# Patient Record
Sex: MALE | Race: WHITE | NOT HISPANIC OR LATINO | URBAN - METROPOLITAN AREA
[De-identification: names, ages, dates, MRNs, and addresses within clinical notes are randomized per-mention and may not be internally consistent; named-entity substitution may affect disease eponyms.]

---

## 2020-10-26 ENCOUNTER — OUT OF OFFICE VISIT (OUTPATIENT)
Dept: URBAN - METROPOLITAN AREA MEDICAL CENTER 25 | Facility: MEDICAL CENTER | Age: 30
End: 2020-10-26
Payer: COMMERCIAL

## 2020-10-26 DIAGNOSIS — K92.0 BLOODY EMESIS: ICD-10-CM

## 2020-10-26 DIAGNOSIS — R93.3 ABN FINDINGS-GI TRACT: ICD-10-CM

## 2020-10-26 DIAGNOSIS — R74.8 ABNORMAL ALKALINE PHOSPHATASE TEST: ICD-10-CM

## 2020-10-26 DIAGNOSIS — K70.10 ACUTE ALCOHOLIC HEPATITIS: ICD-10-CM

## 2020-10-26 PROCEDURE — 99254 IP/OBS CNSLTJ NEW/EST MOD 60: CPT | Performed by: INTERNAL MEDICINE

## 2020-10-30 ENCOUNTER — OUT OF OFFICE VISIT (OUTPATIENT)
Dept: URBAN - METROPOLITAN AREA MEDICAL CENTER 25 | Facility: MEDICAL CENTER | Age: 30
End: 2020-10-30
Payer: COMMERCIAL

## 2020-10-30 DIAGNOSIS — K92.0 BLOODY EMESIS: ICD-10-CM

## 2020-10-30 DIAGNOSIS — I85.10 ESOPH VARICE OTHER DIS: ICD-10-CM

## 2020-10-30 DIAGNOSIS — K70.10 ACUTE ALCOHOLIC HEPATITIS: ICD-10-CM

## 2020-10-30 PROCEDURE — 43235 EGD DIAGNOSTIC BRUSH WASH: CPT | Performed by: INTERNAL MEDICINE

## 2020-11-09 ENCOUNTER — OUT OF OFFICE VISIT (OUTPATIENT)
Dept: URBAN - METROPOLITAN AREA MEDICAL CENTER 25 | Facility: MEDICAL CENTER | Age: 30
End: 2020-11-09
Payer: COMMERCIAL

## 2020-11-09 DIAGNOSIS — G93.41 ENCEPHALOPATHY, METABOLIC: ICD-10-CM

## 2020-11-09 DIAGNOSIS — R19.7 ACUTE DIARRHEA: ICD-10-CM

## 2020-11-09 DIAGNOSIS — A04.72 C. DIFFICILE COLITIS: ICD-10-CM

## 2020-11-09 DIAGNOSIS — R74.8 ABNORMAL ALKALINE PHOSPHATASE TEST: ICD-10-CM

## 2020-11-09 PROCEDURE — 99233 SBSQ HOSP IP/OBS HIGH 50: CPT | Performed by: INTERNAL MEDICINE

## 2020-11-09 PROCEDURE — 99254 IP/OBS CNSLTJ NEW/EST MOD 60: CPT | Performed by: INTERNAL MEDICINE

## 2020-11-09 PROCEDURE — 99232 SBSQ HOSP IP/OBS MODERATE 35: CPT | Performed by: INTERNAL MEDICINE

## 2021-04-18 ENCOUNTER — OUT OF OFFICE VISIT (OUTPATIENT)
Dept: URBAN - METROPOLITAN AREA MEDICAL CENTER 25 | Facility: MEDICAL CENTER | Age: 31
End: 2021-04-18
Payer: COMMERCIAL

## 2021-04-18 DIAGNOSIS — K92.0 BLOODY EMESIS: ICD-10-CM

## 2021-04-18 DIAGNOSIS — F10.129 ALCOHOL ABUSE WITH INTOXICATION, UNSPECIFIED: ICD-10-CM

## 2021-04-18 DIAGNOSIS — K70.10 ACUTE ALCOHOLIC HEPATITIS: ICD-10-CM

## 2021-04-18 DIAGNOSIS — K92.1 BLACK STOOL: ICD-10-CM

## 2021-04-18 PROCEDURE — 99254 IP/OBS CNSLTJ NEW/EST MOD 60: CPT | Performed by: INTERNAL MEDICINE

## 2021-04-19 ENCOUNTER — OUT OF OFFICE VISIT (OUTPATIENT)
Dept: URBAN - METROPOLITAN AREA MEDICAL CENTER 25 | Facility: MEDICAL CENTER | Age: 31
End: 2021-04-19
Payer: COMMERCIAL

## 2021-04-19 DIAGNOSIS — F10.239 ALCOHOL DEPENDENCE WITH WITHDRAWAL, UNSPECIFIED: ICD-10-CM

## 2021-04-19 DIAGNOSIS — R74.01 ALT (SGPT) LEVEL RAISED: ICD-10-CM

## 2021-04-19 DIAGNOSIS — D69.6 ACQUIRED THROMBOCYTOPENIA: ICD-10-CM

## 2021-04-19 DIAGNOSIS — R11.2 ACUTE NAUSEA WITH NONBILIOUS VOMITING: ICD-10-CM

## 2021-04-19 PROCEDURE — 99232 SBSQ HOSP IP/OBS MODERATE 35: CPT | Performed by: INTERNAL MEDICINE

## 2021-04-20 ENCOUNTER — OUT OF OFFICE VISIT (OUTPATIENT)
Dept: URBAN - METROPOLITAN AREA MEDICAL CENTER 25 | Facility: MEDICAL CENTER | Age: 31
End: 2021-04-20
Payer: COMMERCIAL

## 2021-04-20 DIAGNOSIS — R74.8 ABNORMAL ALKALINE PHOSPHATASE TEST: ICD-10-CM

## 2021-04-20 DIAGNOSIS — R11.0 CHRONIC NAUSEA: ICD-10-CM

## 2021-04-20 DIAGNOSIS — R93.3 ABN FINDINGS-GI TRACT: ICD-10-CM

## 2021-04-20 PROCEDURE — 99233 SBSQ HOSP IP/OBS HIGH 50: CPT | Performed by: INTERNAL MEDICINE

## 2021-04-20 PROCEDURE — 99232 SBSQ HOSP IP/OBS MODERATE 35: CPT | Performed by: INTERNAL MEDICINE

## 2021-04-21 ENCOUNTER — LAB OUTSIDE AN ENCOUNTER (OUTPATIENT)
Dept: URBAN - METROPOLITAN AREA CLINIC 19 | Facility: CLINIC | Age: 31
End: 2021-04-21

## 2021-05-04 ENCOUNTER — OFFICE VISIT (OUTPATIENT)
Dept: URBAN - METROPOLITAN AREA CLINIC 19 | Facility: CLINIC | Age: 31
End: 2021-05-04

## 2021-05-04 ENCOUNTER — OFFICE VISIT (OUTPATIENT)
Dept: URBAN - METROPOLITAN AREA CLINIC 19 | Facility: CLINIC | Age: 31
End: 2021-05-04
Payer: COMMERCIAL

## 2021-05-04 DIAGNOSIS — I85.00 ESOPHAGEAL VARICES: ICD-10-CM

## 2021-05-04 DIAGNOSIS — K70.11 ALCOHOLIC HEPATITIS WITH ASCITES: ICD-10-CM

## 2021-05-04 DIAGNOSIS — K72.90 HEPATIC ENCEPHALOPATHY: ICD-10-CM

## 2021-05-04 PROCEDURE — 99214 OFFICE O/P EST MOD 30 MIN: CPT | Performed by: INTERNAL MEDICINE

## 2021-05-04 RX ORDER — LACTULOSE 10 G/15ML
15 ML SOLUTION ORAL TID
Qty: 4050 ML | Refills: 3
End: 2022-04-29

## 2021-05-04 RX ORDER — FOLIC ACID 1 MG/1
1 TABLET TABLET ORAL ONCE A DAY
Qty: 90 | Refills: 3
End: 2022-04-29

## 2021-05-04 RX ORDER — ZINC SULFATE TAB 220 MG (50 MG ZINC EQUIVALENT) 220 (50 ZN) MG
1 TABLET TAB ORAL ONCE A DAY
Qty: 90 TABLET | Refills: 3
End: 2022-04-29

## 2021-05-04 RX ORDER — NADOLOL 40 MG/1
1 TABLET TABLET ORAL ONCE A DAY
Qty: 90 TABLET | Refills: 3 | OUTPATIENT
Start: 2021-05-04

## 2021-05-04 NOTE — HPI-TODAY'S VISIT:
Patient presents as a referral from his PCP, Dr. Mana Castaneda, for evaluation of alcoholic liver disease, for which he was admitted (along with other alcohol-related issues) multiple times over the past year at [facility] (3/21/21 to 3/25/21, 2/28/21 to 3/3/21, 11/8/20 to 11/11/20, 10/25/20 to 11/1/20).  Records reviewed. A copy of this note will be sent to the referring provider.  He has been drinking since age 13, and he also has been addicted to methamphetamines.  Has been in rehab in the past, and he has attended AA with multiple relapses.  During his last admission (4/17/21 to 4/23/21), he was brought in due to severe alcoholic intoxication causing him to pass out, and when he was more coherent, he requested admission for detox.  He was given a list of resources for rehab afterwards, but he has no insurance and cannot afford it right now.  He had told the ED physician that he had been vomiting blood for about a week and seeing dark stools - prior EGD in October showed diminutive esophageal varices and alcoholic gastritis.  CT on 3/21/21 had shown hepatic steatosis but not cirrhosis.  Ultrasound on 4/21/21 showed the same findings.  He has had elevated ammonia levels on a few of these admissions, so he has been diagnosed with hepatic encephalopathy, for which he takes lactulose.  He seems to understand the seriousness of his condition and realizes that without total abstinence from alcohol, he will develop cirrhosis with the potential need for a liver transplant in the future if he wants to survive.  Last labs on 4/23/21: Na 139, K 3.8, Cl 107, CO2 24, glu 98, BUN 2, Cr 0.7, Ca 8.8, Prot 6.5, Alb 3.4, , , , Bili 1.2, WBC 5.8, Hgb 14.3, Hct 42.9, Plt 146, PT/INR 14.5/1.2 (4/22/21).

## 2021-05-10 ENCOUNTER — TELEPHONE ENCOUNTER (OUTPATIENT)
Dept: URBAN - METROPOLITAN AREA CLINIC 19 | Facility: CLINIC | Age: 31
End: 2021-05-10

## 2021-07-15 ENCOUNTER — TELEPHONE ENCOUNTER (OUTPATIENT)
Dept: URBAN - METROPOLITAN AREA CLINIC 19 | Facility: CLINIC | Age: 31
End: 2021-07-15

## 2021-07-20 ENCOUNTER — WEB ENCOUNTER (OUTPATIENT)
Dept: URBAN - METROPOLITAN AREA CLINIC 19 | Facility: CLINIC | Age: 31
End: 2021-07-20

## 2021-07-20 ENCOUNTER — OFFICE VISIT (OUTPATIENT)
Dept: URBAN - METROPOLITAN AREA CLINIC 19 | Facility: CLINIC | Age: 31
End: 2021-07-20
Payer: COMMERCIAL

## 2021-07-20 DIAGNOSIS — K70.11 ALCOHOLIC HEPATITIS WITH ASCITES: ICD-10-CM

## 2021-07-20 DIAGNOSIS — K72.90 HEPATIC ENCEPHALOPATHY: ICD-10-CM

## 2021-07-20 DIAGNOSIS — I85.00 ESOPHAGEAL VARICES: ICD-10-CM

## 2021-07-20 DIAGNOSIS — F10.239 ALCOHOL WITHDRAWAL: ICD-10-CM

## 2021-07-20 PROCEDURE — 99215 OFFICE O/P EST HI 40 MIN: CPT | Performed by: INTERNAL MEDICINE

## 2021-07-20 RX ORDER — LACTULOSE 10 G/15ML
15 ML SOLUTION ORAL TID
Qty: 4050 ML | Refills: 3 | Status: ACTIVE | COMMUNITY
End: 2022-04-29

## 2021-07-20 RX ORDER — FOLIC ACID 1 MG/1
1 TABLET TABLET ORAL ONCE A DAY
Qty: 90 | Refills: 3 | Status: ACTIVE | COMMUNITY
End: 2022-04-29

## 2021-07-20 RX ORDER — NADOLOL 40 MG/1
1 TABLET TABLET ORAL ONCE A DAY
Qty: 90 TABLET | Refills: 3 | Status: ACTIVE | COMMUNITY
Start: 2021-05-04

## 2021-07-20 RX ORDER — ZINC SULFATE TAB 220 MG (50 MG ZINC EQUIVALENT) 220 (50 ZN) MG
1 TABLET TAB ORAL ONCE A DAY
Qty: 90 TABLET | Refills: 3 | Status: ACTIVE | COMMUNITY
End: 2022-04-29

## 2021-07-20 NOTE — HPI-TODAY'S VISIT:
Patient presents as a referral from his PCP, Dr. Mana Castaneda, for evaluation of alcoholic liver disease, for which he was admitted (along with other alcohol-related issues) multiple times over the past year at [facility] (3/21/21 to 3/25/21, 2/28/21 to 3/3/21, 11/8/20 to 11/11/20, 10/25/20 to 11/1/20).  Records reviewed. A copy of this note will be sent to the referring provider.  He has been drinking since age 13, and he also has been addicted to methamphetamines.  Has been in rehab in the past, and he has attended AA with multiple relapses.  During his last admission (4/17/21 to 4/23/21), he was brought in due to severe alcoholic intoxication causing him to pass out, and when he was more coherent, he requested admission for detox.  He was given a list of resources for rehab afterwards, but he has no insurance and cannot afford it right now.  He had told the ED physician that he had been vomiting blood for about a week and seeing dark stools - prior EGD in October showed diminutive esophageal varices and alcoholic gastritis.  CT on 3/21/21 had shown hepatic steatosis but not cirrhosis.  Ultrasound on 4/21/21 showed the same findings.  He has had elevated ammonia levels on a few of these admissions, so he has been diagnosed with hepatic encephalopathy, for which he takes lactulose.  He seems to understand the seriousness of his condition and realizes that without total abstinence from alcohol, he will develop cirrhosis with the potential need for a liver transplant in the future if he wants to survive.  Last labs on 4/23/21: Na 139, K 3.8, Cl 107, CO2 24, glu 98, BUN 2, Cr 0.7, Ca 8.8, Prot 6.5, Alb 3.4, , , , Bili 1.2, WBC 5.8, Hgb 14.3, Hct 42.9, Plt 146, PT/INR 14.5/1.2 (4/22/21).  7/20/21:  Patient was brought to my office today by his mother and grandmother with the pretense that he was having acute abdominal pain.  The patient does not appear to be having severe abdominal pain; however, he has noticed some pain in his foot that he thinks may be gout, and he also has some "red spots" over his abdomen.  He has apparently lost 20 pounds, but he is still overweight.  However, it appears that the patient is still having issues with alcohol addiction.  He was apparently in a detox center earlier this month and started back drinking almost immediately.  He looks visibly tremulous and pale and admittedly drank last night as well.  When I mentioned how sick he looked and the need to go to the ER to rule out things like delirium tremens, pancreatitis, severe alcoholic hepatitis, etc., he refused.  While he verbally refused to be taken to the ER by his family, he was unable to even get out of his chair in the examination room.  EMS was called to bring him to the ER, and I notified the [facility] ER that he was on his way.  I spoke at length with him, his mother, and his grandmother that his mortality from an alcohol-related illness is extremely high.  It is not clear why he is so resistant to alcohol abuse counseling.

## 2021-07-26 PROBLEM — 191480000 ALCOHOL WITHDRAWAL SYNDROME: Status: ACTIVE | Noted: 2021-07-26

## 2021-09-28 ENCOUNTER — CLAIMS CREATED FROM THE CLAIM WINDOW (OUTPATIENT)
Dept: URBAN - METROPOLITAN AREA TELEHEALTH 2 | Facility: TELEHEALTH | Age: 31
End: 2021-09-28
Payer: COMMERCIAL

## 2021-09-28 ENCOUNTER — LAB OUTSIDE AN ENCOUNTER (OUTPATIENT)
Dept: URBAN - METROPOLITAN AREA CLINIC 19 | Facility: CLINIC | Age: 31
End: 2021-09-28

## 2021-09-28 ENCOUNTER — OFFICE VISIT (OUTPATIENT)
Dept: URBAN - METROPOLITAN AREA CLINIC 19 | Facility: CLINIC | Age: 31
End: 2021-09-28

## 2021-09-28 DIAGNOSIS — K70.11 ALCOHOLIC HEPATITIS WITH ASCITES: ICD-10-CM

## 2021-09-28 PROBLEM — 389026000: Status: ACTIVE | Noted: 2021-09-28

## 2021-09-28 LAB
ABSOLUTE NRBC COUNT: 0
AG RATIO: 1
ALBUMIN LEVEL: 4
ALK PHOS: 163
ALT: 117
ANION GAP: 15
AST: 265
BILIRUBIN TOTAL: 1.2
BUN/CREAT RATIO: 4
BUN: 3
CALCIUM LEVEL: 9.4
CHLORIDE LEVEL: 106
CO2 LEVEL: 21
CREATININE LEVEL: 0.7
GFR AFRICAN AMERICAN: >60
GFR NON AFRICAN AMERICAN: >60
GLUCOSE LEVEL: 155
HCT: 35.8
HGB: 11.4
INR: 1.2
MCH: 32.6
MCHC: 31.8
MCV: 102.3
MPV: 10.9
NRBC AUTO: 0
OSMO (CALC): 283
PERFORMING LAB: (no result)
PLATELETS: 377
POTASSIUM LEVEL: 3
PROTEIN TOTAL: 7.3
PT: 14.9
RBC: 3.5
RDW: 15.4
SODIUM LEVEL: 142
WBC: 10.5

## 2021-09-28 PROCEDURE — 99214 OFFICE O/P EST MOD 30 MIN: CPT | Performed by: NURSE PRACTITIONER

## 2021-09-28 PROCEDURE — 99214 OFFICE O/P EST MOD 30 MIN: CPT | Performed by: INTERNAL MEDICINE

## 2021-09-28 RX ORDER — LACTULOSE 10 G/15ML
15 ML SOLUTION ORAL TID
Qty: 4050 ML | Refills: 3 | Status: ON HOLD | COMMUNITY
End: 2022-04-29

## 2021-09-28 RX ORDER — NADOLOL 40 MG/1
1 TABLET TABLET ORAL ONCE A DAY
Qty: 90 TABLET | Refills: 3 | Status: ON HOLD | COMMUNITY
Start: 2021-05-04

## 2021-09-28 RX ORDER — ZINC SULFATE TAB 220 MG (50 MG ZINC EQUIVALENT) 220 (50 ZN) MG
1 TABLET TAB ORAL ONCE A DAY
Qty: 90 TABLET | Refills: 3 | Status: ACTIVE | COMMUNITY
End: 2022-04-29

## 2021-09-28 RX ORDER — FOLIC ACID 1 MG/1
1 TABLET TABLET ORAL ONCE A DAY
Qty: 90 | Refills: 3 | Status: ACTIVE | COMMUNITY
End: 2022-04-29

## 2021-09-28 NOTE — HPI-TODAY'S VISIT:
Patient presents as a referral from his PCP, Dr. Mana Castaneda, for evaluation of alcoholic liver disease, for which he was admitted (along with other alcohol-related issues) multiple times over the past year at [facility] (3/21/21 to 3/25/21, 2/28/21 to 3/3/21, 11/8/20 to 11/11/20, 10/25/20 to 11/1/20).  Records reviewed. A copy of this note will be sent to the referring provider.  He has been drinking since age 13, and he also has been addicted to methamphetamines.  Has been in rehab in the past, and he has attended AA with multiple relapses.  During his last admission (4/17/21 to 4/23/21), he was brought in due to severe alcoholic intoxication causing him to pass out, and when he was more coherent, he requested admission for detox.  He was given a list of resources for rehab afterwards, but he has no insurance and cannot afford it right now.  He had told the ED physician that he had been vomiting blood for about a week and seeing dark stools - prior EGD in October showed diminutive esophageal varices and alcoholic gastritis.  CT on 3/21/21 had shown hepatic steatosis but not cirrhosis.  Ultrasound on 4/21/21 showed the same findings.  He has had elevated ammonia levels on a few of these admissions, so he has been diagnosed with hepatic encephalopathy, for which he takes lactulose.  He seems to understand the seriousness of his condition and realizes that without total abstinence from alcohol, he will develop cirrhosis with the potential need for a liver transplant in the future if he wants to survive.  Last labs on 4/23/21: Na 139, K 3.8, Cl 107, CO2 24, glu 98, BUN 2, Cr 0.7, Ca 8.8, Prot 6.5, Alb 3.4, , , , Bili 1.2, WBC 5.8, Hgb 14.3, Hct 42.9, Plt 146, PT/INR 14.5/1.2 (4/22/21).  7/20/21:  Patient was brought to my office today by his mother and grandmother with the pretense that he was having acute abdominal pain.  The patient does not appear to be having severe abdominal pain; however, he has noticed some pain in his foot that he thinks may be gout, and he also has some "red spots" over his abdomen.  He has apparently lost 20 pounds, but he is still overweight.  However, it appears that the patient is still having issues with alcohol addiction.  He was apparently in a detox center earlier this month and started back drinking almost immediately.  He looks visibly tremulous and pale and admittedly drank last night as well.  When I mentioned how sick he looked and the need to go to the ER to rule out things like delirium tremens, pancreatitis, severe alcoholic hepatitis, etc., he refused.  While he verbally refused to be taken to the ER by his family, he was unable to even get out of his chair in the examination room.  EMS was called to bring him to the ER, and I notified the [facility] ER that he was on his way.  I spoke at length with him, his mother, and his grandmother that his mortality from an alcohol-related illness is extremely high.  It is not clear why he is so resistant to alcohol abuse counseling.  9/28/21: Today he reports the left side of his abdomen is swollen.  He reports it has been gone down a lot since he made the appointment.  He reports typically it is the right side that swells when he drinks.  Right side is not swollen at all currently.  This started about 3 and half weeks ago.  He reports he has never had a paracentesis.  He denies any abdominal pain.  He reports he stopped drinking about a week and a half ago and then started drinking again about 2 nights ago.  He reports he typically binge drinks with less time in between binging episodes with more frequent liver disease symptoms.  He reports he typically drinks a handle of fireball in a day when he is binging and these binges will last 3 to 7 days.  He reports he has not had any lab work recently.  He states the only medications he is currently taking is his vitamins occasionally.  He reports he takes lactulose when he has confusion.  He denies any confusion and reports he has not taken lactulose in weeks.  He reports when his abdomen began swelling he has noticed his skin and eyes turning yellow.  He reports they are normal now but that often happens when he drinks.  He also has noticed swelling in his legs from his knees to his feet.  He denies fever.  Has never taken any diuretics.  Reports his urine is dark yellow.  He endorses itching and shortness of breath.  He reports he went to rehab in 2016 but does not have insurance now which is why he has not pursued it.  He believes AA has not helped and actually made him want to drink more.

## 2021-09-29 ENCOUNTER — TELEPHONE ENCOUNTER (OUTPATIENT)
Dept: URBAN - METROPOLITAN AREA CLINIC 19 | Facility: CLINIC | Age: 31
End: 2021-09-29

## 2021-09-30 LAB
AFP TUMOR MARKER: (no result)
PERFORMING LAB: (no result)

## 2021-10-05 ENCOUNTER — LAB OUTSIDE AN ENCOUNTER (OUTPATIENT)
Dept: URBAN - METROPOLITAN AREA CLINIC 19 | Facility: CLINIC | Age: 31
End: 2021-10-05

## 2021-10-05 ENCOUNTER — OUT OF OFFICE VISIT (OUTPATIENT)
Dept: URBAN - METROPOLITAN AREA MEDICAL CENTER 25 | Facility: MEDICAL CENTER | Age: 31
End: 2021-10-05
Payer: COMMERCIAL

## 2021-10-05 DIAGNOSIS — R11.2 ACUTE NAUSEA WITH NONBILIOUS VOMITING: ICD-10-CM

## 2021-10-05 DIAGNOSIS — K76.9 ACUTE LIVER DISEASE: ICD-10-CM

## 2021-10-05 DIAGNOSIS — R74.8 ABNORMAL ALKALINE PHOSPHATASE TEST: ICD-10-CM

## 2021-10-05 DIAGNOSIS — R19.5 ABNORMAL CONSISTENCY OF STOOL: ICD-10-CM

## 2021-10-05 PROCEDURE — 99232 SBSQ HOSP IP/OBS MODERATE 35: CPT | Performed by: INTERNAL MEDICINE

## 2021-10-05 PROCEDURE — 99254 IP/OBS CNSLTJ NEW/EST MOD 60: CPT | Performed by: INTERNAL MEDICINE

## 2021-10-08 ENCOUNTER — OUT OF OFFICE VISIT (OUTPATIENT)
Dept: URBAN - METROPOLITAN AREA MEDICAL CENTER 25 | Facility: MEDICAL CENTER | Age: 31
End: 2021-10-08
Payer: COMMERCIAL

## 2021-10-08 DIAGNOSIS — I85.10 ESOPH VARICE OTHER DIS: ICD-10-CM

## 2021-10-08 DIAGNOSIS — K76.6 CLINICALLY SIGNIFICANT PORTAL HYPERTENSION: ICD-10-CM

## 2021-10-08 DIAGNOSIS — K92.1 ACUTE MELENA: ICD-10-CM

## 2021-10-08 PROCEDURE — 43235 EGD DIAGNOSTIC BRUSH WASH: CPT | Performed by: INTERNAL MEDICINE

## 2021-10-08 PROCEDURE — 99232 SBSQ HOSP IP/OBS MODERATE 35: CPT | Performed by: INTERNAL MEDICINE

## 2021-10-25 ENCOUNTER — TELEPHONE ENCOUNTER (OUTPATIENT)
Dept: URBAN - METROPOLITAN AREA SURGERY CENTER 30 | Facility: SURGERY CENTER | Age: 31
End: 2021-10-25

## 2021-10-25 RX ORDER — ONDANSETRON 4 MG/1
1 TABLET ON THE TONGUE AND ALLOW TO DISSOLVE TABLET, ORALLY DISINTEGRATING ORAL
Qty: 40 | Refills: 1 | OUTPATIENT
Start: 2021-10-26

## 2021-10-29 ENCOUNTER — OFFICE VISIT (OUTPATIENT)
Dept: URBAN - METROPOLITAN AREA TELEHEALTH 2 | Facility: TELEHEALTH | Age: 31
End: 2021-10-29
Payer: COMMERCIAL

## 2021-10-29 DIAGNOSIS — K70.30 ALCOHOLIC CIRRHOSIS, UNSPECIFIED WHETHER ASCITES PRESENT: ICD-10-CM

## 2021-10-29 DIAGNOSIS — R11.14 BILIOUS VOMITING WITH NAUSEA: ICD-10-CM

## 2021-10-29 PROCEDURE — 99213 OFFICE O/P EST LOW 20 MIN: CPT | Performed by: INTERNAL MEDICINE

## 2021-10-29 RX ORDER — ZINC SULFATE TAB 220 MG (50 MG ZINC EQUIVALENT) 220 (50 ZN) MG
1 TABLET TAB ORAL ONCE A DAY
Qty: 90 TABLET | Refills: 3 | Status: ON HOLD | COMMUNITY
End: 2022-04-29

## 2021-10-29 RX ORDER — ONDANSETRON 4 MG/1
1 TABLET ON THE TONGUE AND ALLOW TO DISSOLVE TABLET, ORALLY DISINTEGRATING ORAL
Qty: 40 | Refills: 1 | Status: ACTIVE | COMMUNITY
Start: 2021-10-26

## 2021-10-29 RX ORDER — LACTULOSE 10 G/15ML
15 ML SOLUTION ORAL TID
Qty: 4050 ML | Refills: 3 | Status: ACTIVE | COMMUNITY
End: 2022-04-29

## 2021-10-29 RX ORDER — NADOLOL 40 MG/1
1 TABLET TABLET ORAL ONCE A DAY
Qty: 90 TABLET | Refills: 3 | Status: ON HOLD | COMMUNITY
Start: 2021-05-04

## 2021-10-29 RX ORDER — FOLIC ACID 1 MG/1
1 TABLET TABLET ORAL ONCE A DAY
Qty: 90 | Refills: 3 | Status: ON HOLD | COMMUNITY
End: 2022-04-29

## 2021-10-29 NOTE — HPI-TODAY'S VISIT:
5/4/21: Patient presents as a referral from his PCP, Dr. Mana Castaneda, for evaluation of alcoholic liver disease, for which he was admitted (along with other alcohol-related issues) multiple times over the past year at [facility] (3/21/21 to 3/25/21, 2/28/21 to 3/3/21, 11/8/20 to 11/11/20, 10/25/20 to 11/1/20).  Records reviewed. A copy of this note will be sent to the referring provider.  He has been drinking since age 13, and he also has been addicted to methamphetamines.  Has been in rehab in the past, and he has attended AA with multiple relapses.  During his last admission (4/17/21 to 4/23/21), he was brought in due to severe alcoholic intoxication causing him to pass out, and when he was more coherent, he requested admission for detox.  He was given a list of resources for rehab afterwards, but he has no insurance and cannot afford it right now.  He had told the ED physician that he had been vomiting blood for about a week and seeing dark stools - prior EGD in October showed diminutive esophageal varices and alcoholic gastritis.  CT on 3/21/21 had shown hepatic steatosis but not cirrhosis.  Ultrasound on 4/21/21 showed the same findings.  He has had elevated ammonia levels on a few of these admissions, so he has been diagnosed with hepatic encephalopathy, for which he takes lactulose.  He seems to understand the seriousness of his condition and realizes that without total abstinence from alcohol, he will develop cirrhosis with the potential need for a liver transplant in the future if he wants to survive.  Last labs on 4/23/21: Na 139, K 3.8, Cl 107, CO2 24, glu 98, BUN 2, Cr 0.7, Ca 8.8, Prot 6.5, Alb 3.4, , , , Bili 1.2, WBC 5.8, Hgb 14.3, Hct 42.9, Plt 146, PT/INR 14.5/1.2 (4/22/21).  7/20/21:  Patient was brought to my office today by his mother and grandmother with the pretense that he was having acute abdominal pain.  The patient does not appear to be having severe abdominal pain; however, he has noticed some pain in his foot that he thinks may be gout, and he also has some "red spots" over his abdomen.  He has apparently lost 20 pounds, but he is still overweight.  However, it appears that the patient is still having issues with alcohol addiction.  He was apparently in a detox center earlier this month and started back drinking almost immediately.  He looks visibly tremulous and pale and admittedly drank last night as well.  When I mentioned how sick he looked and the need to go to the ER to rule out things like delirium tremens, pancreatitis, severe alcoholic hepatitis, etc., he refused.  While he verbally refused to be taken to the ER by his family, he was unable to even get out of his chair in the examination room.  EMS was called to bring him to the ER, and I notified the [facility] ER that he was on his way.  I spoke at length with him, his mother, and his grandmother that his mortality from an alcohol-related illness is extremely high.  It is not clear why he is so resistant to alcohol abuse counseling.  9/28/21: Today he reports the left side of his abdomen is swollen.  He reports it has been gone down a lot since he made the appointment.  He reports typically it is the right side that swells when he drinks.  Right side is not swollen at all currently.  This started about 3 and half weeks ago.  He reports he has never had a paracentesis.  He denies any abdominal pain.  He reports he stopped drinking about a week and a half ago and then started drinking again about 2 nights ago.  He reports he typically binge drinks with less time in between binging episodes with more frequent liver disease symptoms.  He reports he typically drinks a handle of fireball in a day when he is binging and these binges will last 3 to 7 days.  He reports he has not had any lab work recently.  He states the only medications he is currently taking is his vitamins occasionally.  He reports he takes lactulose when he has confusion.  He denies any confusion and reports he has not taken lactulose in weeks.  He reports when his abdomen began swelling he has noticed his skin and eyes turning yellow.  He reports they are normal now but that often happens when he drinks.  He also has noticed swelling in his legs from his knees to his feet.  He denies fever.  Has never taken any diuretics.  Reports his urine is dark yellow.  He endorses itching and shortness of breath.  He reports he went to rehab in 2016 but does not have insurance now which is why he has not pursued it.  He believes AA has not helped and actually made him want to drink more.   10/29/21: Today, Mr. Deng is following up from recent hospital admission. He was admitted to [facility] from 10/5/2021 to 10/9/2021 for alcohol intoxication/withdrawal and seizures.  Also reported black stools as well as nausea, vomiting, and left-sided abdominal pain. He had reported he had been drinking between 1 and 3 handles daily and had a seizure the morning he presented to the ER and hit his head.  Reported he had been trying to cut back on drinking.  Noncompliant with lactulose.  Labs- WBC 12.3, Hgb 13.5, HCT 41.4, MCV 98.6, platelets 444, PT 16.4, INR 1.4, sodium 144, potassium 3.1, creatinine 0.7, bilirubin 1.3, alk phos 204, , ALT 57, ammonia 110, ethanol 338.  CT angio-no aortic aneurysm or dissection.  Again present is marked hepatomegaly with diffuse fatty infiltration.  EGD with Dr. Saleem 10/8/2021-small less than 5 mm esophageal varices.  Portal hypertensive gastropathy.  Normal examined duodenum.  No specimens collected.  He was started on lactulose, Xifaxan, and zinc as well as hydrocortisone cream for possible hemorrhoids.  Labs at discharge- WBC 7.1, Hgb 11.1, platelets 129, bili 1.4, alk phos 213, , ALT 44.  Today, he reports he is trying to cut back on his alcohol use.  He reports that prior to going to the ER his dad passed away from an overdose.  Currently drinking about half a handle to a handle of liquor a day.  Denies ascites.  Has never had a paracentesis.  Denies jaundice.  Reports some itching on the back.  Confusion a couple of times since leaving hospital.  He states the only medication he is taking his lactulose about twice a day.  Reports his mom calls him and remind him to take it.  He is also taking Zofran for nausea, which helps for about 2 hours.  Not on any other medications.  No abdominal pain at this time.  He does report he is vomiting about every day.  Emesis is yellow, no blood.  Typically happens in the morning.  Having bowel movements daily that are typically loose-has gone 4 times today.  Reports occasional dark stools.  Still uninterested in inpatient detox or any alcohol cessation help.

## 2021-10-29 NOTE — PHYSICAL EXAM CHEST:
no lesions,  no deformities,  no masses present, breathing is unlabored without accessory muscle use

## 2021-11-03 ENCOUNTER — TELEPHONE ENCOUNTER (OUTPATIENT)
Dept: URBAN - METROPOLITAN AREA CLINIC 19 | Facility: CLINIC | Age: 31
End: 2021-11-03

## 2021-11-19 ENCOUNTER — TELEPHONE ENCOUNTER (OUTPATIENT)
Dept: URBAN - METROPOLITAN AREA CLINIC 19 | Facility: CLINIC | Age: 31
End: 2021-11-19

## 2021-11-19 RX ORDER — ONDANSETRON 4 MG/1
1 TABLET ON THE TONGUE AND ALLOW TO DISSOLVE TABLET, ORALLY DISINTEGRATING ORAL
Qty: 40 | Refills: 1 | Status: ACTIVE | COMMUNITY
Start: 2021-10-26

## 2021-11-19 RX ORDER — ZINC SULFATE TAB 220 MG (50 MG ZINC EQUIVALENT) 220 (50 ZN) MG
1 TABLET TAB ORAL ONCE A DAY
Qty: 90 TABLET | Refills: 3 | Status: ON HOLD | COMMUNITY
End: 2022-04-29

## 2021-11-19 RX ORDER — PANTOPRAZOLE SODIUM 40 MG/1
1 TABLET TABLET, DELAYED RELEASE ORAL ONCE A DAY
Qty: 90 TABLET | Refills: 1 | OUTPATIENT
Start: 2021-11-23

## 2021-11-19 RX ORDER — LACTULOSE 10 G/15ML
15 ML SOLUTION ORAL TID
Qty: 4050 ML | Refills: 3 | Status: ACTIVE | COMMUNITY
End: 2022-04-29

## 2021-11-19 RX ORDER — FOLIC ACID 1 MG/1
1 TABLET TABLET ORAL ONCE A DAY
Qty: 90 | Refills: 3 | Status: ON HOLD | COMMUNITY
End: 2022-04-29

## 2021-11-19 RX ORDER — NADOLOL 40 MG/1
1 TABLET TABLET ORAL ONCE A DAY
Qty: 90 TABLET | Refills: 3 | Status: ON HOLD | COMMUNITY
Start: 2021-05-04

## 2021-11-23 PROBLEM — 235595009: Status: ACTIVE | Noted: 2021-11-23

## 2021-12-17 ENCOUNTER — OFFICE VISIT (OUTPATIENT)
Dept: URBAN - METROPOLITAN AREA CLINIC 19 | Facility: CLINIC | Age: 31
End: 2021-12-17
Payer: COMMERCIAL

## 2021-12-17 VITALS
BODY MASS INDEX: 32.59 KG/M2 | HEART RATE: 111 BPM | TEMPERATURE: 98.3 F | SYSTOLIC BLOOD PRESSURE: 118 MMHG | WEIGHT: 202.8 LBS | OXYGEN SATURATION: 96 % | DIASTOLIC BLOOD PRESSURE: 75 MMHG | HEIGHT: 66 IN

## 2021-12-17 DIAGNOSIS — K74.60 CIRRHOSIS OF LIVER WITHOUT ASCITES, UNSPECIFIED HEPATIC CIRRHOSIS TYPE: ICD-10-CM

## 2021-12-17 PROCEDURE — 99203 OFFICE O/P NEW LOW 30 MIN: CPT | Performed by: STUDENT IN AN ORGANIZED HEALTH CARE EDUCATION/TRAINING PROGRAM

## 2021-12-17 RX ORDER — ONDANSETRON 4 MG/1
1 TABLET ON THE TONGUE AND ALLOW TO DISSOLVE TABLET, ORALLY DISINTEGRATING ORAL
Qty: 40 | Refills: 1 | Status: ACTIVE | COMMUNITY
Start: 2021-10-26

## 2021-12-17 RX ORDER — FOLIC ACID 1 MG/1
1 TABLET TABLET ORAL ONCE A DAY
Qty: 90 | Refills: 3 | Status: ON HOLD | COMMUNITY
End: 2022-04-29

## 2021-12-17 RX ORDER — ZINC SULFATE TAB 220 MG (50 MG ZINC EQUIVALENT) 220 (50 ZN) MG
1 TABLET TAB ORAL ONCE A DAY
Qty: 90 TABLET | Refills: 3 | Status: ON HOLD | COMMUNITY
End: 2022-04-29

## 2021-12-17 RX ORDER — LACTULOSE 10 G/15ML
15 ML SOLUTION ORAL TID
Qty: 4050 ML | Refills: 3 | Status: ACTIVE | COMMUNITY
End: 2022-04-29

## 2021-12-17 RX ORDER — NADOLOL 40 MG/1
1 TABLET TABLET ORAL ONCE A DAY
Qty: 90 TABLET | Refills: 3
Start: 2021-05-04

## 2021-12-17 RX ORDER — NADOLOL 40 MG/1
1 TABLET TABLET ORAL ONCE A DAY
Qty: 90 TABLET | Refills: 3 | COMMUNITY
Start: 2021-05-04

## 2021-12-17 RX ORDER — PANTOPRAZOLE SODIUM 40 MG/1
1 TABLET TABLET, DELAYED RELEASE ORAL ONCE A DAY
Qty: 90 TABLET | Refills: 1 | Status: ACTIVE | COMMUNITY
Start: 2021-11-23

## 2021-12-17 RX ORDER — FLUTICASONE FUROATE, UMECLIDINIUM BROMIDE AND VILANTEROL TRIFENATATE 100; 62.5; 25 UG/1; UG/1; UG/1
1 PUFF POWDER RESPIRATORY (INHALATION) ONCE A DAY
Status: ACTIVE | COMMUNITY

## 2021-12-17 NOTE — EXAM-FUNCTIONAL ASSESSMENT
Patient wearing mask due to COVID-19  General--no acute distress, resting comfortably Eyes--anicteric, no pallor HENT--normocephalic, atraumatic head Neck--no lymphadenopathy, non tender Chest--normal breath sounds, equal rise Heart--regular rate and rhythm Abdomen--soft, non tender, non distended, bowel sounds present, no hepatomegaly, ascietes absent NEuro:  AAO x 3, no confusion and able to provide hisotyr accurately, no BUE asterixis

## 2021-12-17 NOTE — HPI-TODAY'S VISIT:
The pt is a 30 yo M who presents for hospital follow up for cirrhosis with melena/hematemsis s/p EGD in the hospital with Grade I varices found.  His says his stomach pain is getting worse but is still tolerable.  Abdominal pain that is sharp, migratory, post-prandial and lasts for a few days.  Does not appear to have had intrinsic liver work-up.  Ethanol level high.  Continues to drink still.  Abstinent for 4-5 days.  Has his gall bladder.  Presence of anxiety, brain fog.  Previous diagnosis of this condition was made on :  about a year ago. Current symptoms:  Occasional bright red blood/hematemesis, Melena,   Has been constipated for 2 days. Recent hospitalizations:  10/08 - 10/11/21  Medications for cirrhosis: Takes lactulose occasionally and has discontinued the zinc because of nausea. Low sodium diet of max 2000 mg or 2 g daily: Adequate protein intake or seen a nutritonist for counseling on cirrhosis diet; Last variceal screening: 10/8/21 by Dr. Saleem  FH of liver diseases:  None, dad, grandfather and multiple male relative FH of luminal GI malignancies:  No known family history.

## 2021-12-23 ENCOUNTER — OUT OF OFFICE VISIT (OUTPATIENT)
Dept: URBAN - METROPOLITAN AREA MEDICAL CENTER 25 | Facility: MEDICAL CENTER | Age: 31
End: 2021-12-23
Payer: COMMERCIAL

## 2021-12-23 DIAGNOSIS — R74.8 ABNORMAL ALKALINE PHOSPHATASE TEST: ICD-10-CM

## 2021-12-23 DIAGNOSIS — R19.5 ABNORMAL CONSISTENCY OF STOOL: ICD-10-CM

## 2021-12-23 DIAGNOSIS — D64.89 ANEMIA DUE TO OTHER CAUSE: ICD-10-CM

## 2021-12-23 DIAGNOSIS — R93.3 ABN FINDINGS-GI TRACT: ICD-10-CM

## 2021-12-23 PROCEDURE — 99253 IP/OBS CNSLTJ NEW/EST LOW 45: CPT | Performed by: STUDENT IN AN ORGANIZED HEALTH CARE EDUCATION/TRAINING PROGRAM

## 2021-12-24 ENCOUNTER — OUT OF OFFICE VISIT (OUTPATIENT)
Dept: URBAN - METROPOLITAN AREA MEDICAL CENTER 25 | Facility: MEDICAL CENTER | Age: 31
End: 2021-12-24
Payer: COMMERCIAL

## 2021-12-24 DIAGNOSIS — I85.10 ESOPH VARICE OTHER DIS: ICD-10-CM

## 2021-12-24 DIAGNOSIS — R74.8 ABNORMAL ALKALINE PHOSPHATASE TEST: ICD-10-CM

## 2021-12-24 DIAGNOSIS — K92.0 BLOODY EMESIS: ICD-10-CM

## 2021-12-24 DIAGNOSIS — K70.30 ALC (ALCOHOLIC LIVER CIRRHOSIS): ICD-10-CM

## 2021-12-24 DIAGNOSIS — K76.6 CLINICALLY SIGNIFICANT PORTAL HYPERTENSION: ICD-10-CM

## 2021-12-24 PROCEDURE — 43235 EGD DIAGNOSTIC BRUSH WASH: CPT | Performed by: STUDENT IN AN ORGANIZED HEALTH CARE EDUCATION/TRAINING PROGRAM

## 2021-12-24 PROCEDURE — 99232 SBSQ HOSP IP/OBS MODERATE 35: CPT | Performed by: STUDENT IN AN ORGANIZED HEALTH CARE EDUCATION/TRAINING PROGRAM

## 2021-12-27 ENCOUNTER — OUT OF OFFICE VISIT (OUTPATIENT)
Dept: URBAN - METROPOLITAN AREA MEDICAL CENTER 25 | Facility: MEDICAL CENTER | Age: 31
End: 2021-12-27
Payer: COMMERCIAL

## 2021-12-27 DIAGNOSIS — I85.10 ESOPH VARICE OTHER DIS: ICD-10-CM

## 2021-12-27 DIAGNOSIS — D62 ABLA (ACUTE BLOOD LOSS ANEMIA): ICD-10-CM

## 2021-12-27 DIAGNOSIS — K70.10 ACUTE ALCOHOLIC HEPATITIS: ICD-10-CM

## 2021-12-27 DIAGNOSIS — K92.2 ACUTE GASTROINTESTINAL BLEEDING: ICD-10-CM

## 2021-12-27 PROCEDURE — 99232 SBSQ HOSP IP/OBS MODERATE 35: CPT | Performed by: INTERNAL MEDICINE

## 2022-01-06 ENCOUNTER — TELEPHONE ENCOUNTER (OUTPATIENT)
Dept: URBAN - METROPOLITAN AREA CLINIC 19 | Facility: CLINIC | Age: 32
End: 2022-01-06

## 2022-01-06 ENCOUNTER — WEB ENCOUNTER (OUTPATIENT)
Dept: URBAN - METROPOLITAN AREA CLINIC 19 | Facility: CLINIC | Age: 32
End: 2022-01-06

## 2022-01-06 PROBLEM — 28670008 ESOPHAGEAL VARICES: Status: ACTIVE | Noted: 2021-05-04

## 2022-01-06 PROBLEM — 19943007: Status: ACTIVE | Noted: 2021-09-28

## 2022-01-20 ENCOUNTER — OFFICE VISIT (OUTPATIENT)
Dept: URBAN - METROPOLITAN AREA CLINIC 19 | Facility: CLINIC | Age: 32
End: 2022-01-20
Payer: COMMERCIAL

## 2022-01-20 ENCOUNTER — LAB OUTSIDE AN ENCOUNTER (OUTPATIENT)
Dept: URBAN - METROPOLITAN AREA CLINIC 19 | Facility: CLINIC | Age: 32
End: 2022-01-20

## 2022-01-20 VITALS
TEMPERATURE: 97.6 F | HEART RATE: 90 BPM | BODY MASS INDEX: 32.14 KG/M2 | HEIGHT: 66 IN | DIASTOLIC BLOOD PRESSURE: 90 MMHG | WEIGHT: 200 LBS | SYSTOLIC BLOOD PRESSURE: 130 MMHG

## 2022-01-20 DIAGNOSIS — A04.72 C. DIFFICILE DIARRHEA: ICD-10-CM

## 2022-01-20 DIAGNOSIS — R11.14 BILIOUS VOMITING WITH NAUSEA: ICD-10-CM

## 2022-01-20 DIAGNOSIS — K72.90 HEPATIC ENCEPHALOPATHY: ICD-10-CM

## 2022-01-20 DIAGNOSIS — R11.0 NAUSEA: ICD-10-CM

## 2022-01-20 DIAGNOSIS — F10.10 ALCOHOL ABUSE: ICD-10-CM

## 2022-01-20 PROBLEM — 422587007: Status: ACTIVE | Noted: 2022-01-20

## 2022-01-20 PROBLEM — 71419002: Status: ACTIVE | Noted: 2021-11-03

## 2022-01-20 PROBLEM — 15167005: Status: ACTIVE | Noted: 2022-01-20

## 2022-01-20 PROBLEM — 13920009 HEPATIC ENCEPHALOPATHY: Status: ACTIVE | Noted: 2021-05-04

## 2022-01-20 PROCEDURE — 99212 OFFICE O/P EST SF 10 MIN: CPT | Performed by: STUDENT IN AN ORGANIZED HEALTH CARE EDUCATION/TRAINING PROGRAM

## 2022-01-20 RX ORDER — FLUTICASONE FUROATE, UMECLIDINIUM BROMIDE AND VILANTEROL TRIFENATATE 100; 62.5; 25 UG/1; UG/1; UG/1
1 PUFF POWDER RESPIRATORY (INHALATION) ONCE A DAY
Status: ON HOLD | COMMUNITY

## 2022-01-20 RX ORDER — ONDANSETRON 4 MG/1
1 TABLET ON THE TONGUE AND ALLOW TO DISSOLVE TABLET, ORALLY DISINTEGRATING ORAL
Qty: 40 | Refills: 1 | Status: ACTIVE | COMMUNITY
Start: 2021-10-26

## 2022-01-20 RX ORDER — PANTOPRAZOLE SODIUM 40 MG/1
1 TABLET TABLET, DELAYED RELEASE ORAL ONCE A DAY
Qty: 90 TABLET | Refills: 1 | Status: ACTIVE | COMMUNITY
Start: 2021-11-23

## 2022-01-20 RX ORDER — NADOLOL 40 MG/1
1 TABLET TABLET ORAL ONCE A DAY
Qty: 90 TABLET | Refills: 3 | Status: ACTIVE | COMMUNITY
Start: 2021-05-04

## 2022-01-20 RX ORDER — FOLIC ACID 1 MG/1
1 TABLET TABLET ORAL ONCE A DAY
Qty: 90 | Refills: 3 | Status: ON HOLD | COMMUNITY
End: 2022-04-29

## 2022-01-20 RX ORDER — ZINC SULFATE TAB 220 MG (50 MG ZINC EQUIVALENT) 220 (50 ZN) MG
1 TABLET TAB ORAL ONCE A DAY
Qty: 90 TABLET | Refills: 3 | Status: ON HOLD | COMMUNITY
End: 2022-04-29

## 2022-01-20 RX ORDER — LACTULOSE 10 G/15ML
15 ML SOLUTION ORAL TID
Qty: 4050 ML | Refills: 3 | Status: ACTIVE | COMMUNITY
End: 2022-04-29

## 2022-01-20 NOTE — HPI-TODAY'S VISIT:
The pt is here for a hospital f/u.  Initially presented for melena and nausea.  Was under CIWA for withdrawal and was diagnosed with C. diff  colitis and started on PO vancomycin.  Underwent EGD with findings of grade 1 varices without any other source of bleeding.  This had resolved on presentation and his H&H stayed stable for the remainder of the hospital visit.    Continues to have 2-5 bms in a 24 hour period.  He finished the c. diff tx for 10 days.  Has had alcohol once since.  Has been taking lactulose.Reports ongoing confusion even when he is not drinking.    Also report nausea and emesis, which is chronic.  He says he has always woken up and thrown up first thing in the morning.  Patient also reports that in the past he has been to alcohol Anonymous.

## 2022-01-20 NOTE — EXAM-FUNCTIONAL ASSESSMENT
Patient wearing mask due to COVID-19  General--no acute distress, resting comfortably Eyes--anicteric, no pallor HENT--normocephalic, atraumatic head Neck--no lymphadenopathy, non tender Chest--normal breath sounds, equal rise Heart--regular rate and rhythm Neuro--AAO x4, no bilateral upper extremity asterixis Abdomen--soft, non tender, non distended, bowel sounds present, no hepatomegaly

## 2022-01-21 ENCOUNTER — OFFICE VISIT (OUTPATIENT)
Dept: URBAN - METROPOLITAN AREA CLINIC 19 | Facility: CLINIC | Age: 32
End: 2022-01-21

## 2022-01-21 LAB
ABSOLUTE BASOPHIL COUNT: 0.07
ABSOLUTE EOSINOPHIL COUNT: 0.12
ABSOLUTE IMMATURE GRANULOCYTE  COUNT: 0.02
ABSOLUTE LYMPHOCYTE COUNT: 1.73
ABSOLUTE MONOCYTE COUNT: 0.45
ABSOLUTE NEUTROPHIL COUNT (ANC): 4.52
ABSOLUTE NRBC  COUNT: 0
AG RATIO: 1
ALBUMIN LEVEL: 4.1
ALK PHOS: 176
ALT: 38
ANION GAP: 11
AST: 58
BASOPHIL AUTO: 1
BILIRUBIN TOTAL: 0.9
BUN/CREAT RATIO: 8
BUN: 6
CALCIUM LEVEL: 9.2
CHLORIDE LEVEL: 103
CO2 LEVEL: 27
CREATININE LEVEL: 0.8
EOS AUTO: 2
GFR AFRICAN AMERICAN: >60
GFR NON AFRICAN AMERICAN: >60
GLUCOSE LEVEL: 91
HCT: 39.5
HGB: 12.8
IMMATURE GRANULOCYTES AUTO: 0.3
LYMPH AUTO: 25
MCH: 31.8
MCHC: 32.4
MCV: 98.3
MONO AUTO: 6
MPV: 12
NEUTRO AUTO: 66
NRBC AUTO: 0
OSMO (CALC): 279
PERFORMING LAB: (no result)
PLATELETS: 234
POTASSIUM LEVEL: 4
PROTEIN TOTAL: 7.4
RBC: 4.02
RDW: 13.2
SODIUM LEVEL: 141
WBC: 6.9

## 2022-02-11 ENCOUNTER — OUT OF OFFICE VISIT (OUTPATIENT)
Dept: URBAN - METROPOLITAN AREA MEDICAL CENTER 25 | Facility: MEDICAL CENTER | Age: 32
End: 2022-02-11
Payer: COMMERCIAL

## 2022-02-11 DIAGNOSIS — R93.3 ABN FINDINGS-GI TRACT: ICD-10-CM

## 2022-02-11 DIAGNOSIS — A04.72 C. DIFFICILE: ICD-10-CM

## 2022-02-11 DIAGNOSIS — K92.0 BLOODY EMESIS: ICD-10-CM

## 2022-02-11 DIAGNOSIS — R74.8 ABNORMAL ALKALINE PHOSPHATASE TEST: ICD-10-CM

## 2022-02-11 PROCEDURE — 99254 IP/OBS CNSLTJ NEW/EST MOD 60: CPT | Performed by: INTERNAL MEDICINE

## 2022-02-12 ENCOUNTER — OUT OF OFFICE VISIT (OUTPATIENT)
Dept: URBAN - METROPOLITAN AREA MEDICAL CENTER 25 | Facility: MEDICAL CENTER | Age: 32
End: 2022-02-12
Payer: COMMERCIAL

## 2022-02-12 DIAGNOSIS — K76.6 CLINICALLY SIGNIFICANT PORTAL HYPERTENSION: ICD-10-CM

## 2022-02-12 DIAGNOSIS — I85.10 ESOPH VARICE OTHER DIS: ICD-10-CM

## 2022-02-12 DIAGNOSIS — D62 ABLA (ACUTE BLOOD LOSS ANEMIA): ICD-10-CM

## 2022-02-12 PROCEDURE — 43235 EGD DIAGNOSTIC BRUSH WASH: CPT | Performed by: INTERNAL MEDICINE

## 2022-02-23 ENCOUNTER — OUT OF OFFICE VISIT (OUTPATIENT)
Dept: URBAN - METROPOLITAN AREA MEDICAL CENTER 25 | Facility: MEDICAL CENTER | Age: 32
End: 2022-02-23
Payer: COMMERCIAL

## 2022-02-23 DIAGNOSIS — K70.30 ALC (ALCOHOLIC LIVER CIRRHOSIS): ICD-10-CM

## 2022-02-23 DIAGNOSIS — R74.8 ABNORMAL ALKALINE PHOSPHATASE TEST: ICD-10-CM

## 2022-02-23 DIAGNOSIS — K92.0 BLOODY EMESIS: ICD-10-CM

## 2022-02-23 PROCEDURE — 99254 IP/OBS CNSLTJ NEW/EST MOD 60: CPT | Performed by: PHYSICIAN ASSISTANT

## 2022-02-24 ENCOUNTER — OUT OF OFFICE VISIT (OUTPATIENT)
Dept: URBAN - METROPOLITAN AREA MEDICAL CENTER 25 | Facility: MEDICAL CENTER | Age: 32
End: 2022-02-24
Payer: COMMERCIAL

## 2022-02-24 DIAGNOSIS — K92.0 BLOODY EMESIS: ICD-10-CM

## 2022-02-24 PROCEDURE — 43235 EGD DIAGNOSTIC BRUSH WASH: CPT | Performed by: INTERNAL MEDICINE

## 2022-02-25 ENCOUNTER — WEB ENCOUNTER (OUTPATIENT)
Dept: URBAN - METROPOLITAN AREA CLINIC 19 | Facility: CLINIC | Age: 32
End: 2022-02-25

## 2022-04-21 ENCOUNTER — OFFICE VISIT (OUTPATIENT)
Dept: URBAN - METROPOLITAN AREA CLINIC 19 | Facility: CLINIC | Age: 32
End: 2022-04-21

## 2022-04-23 ENCOUNTER — WEB ENCOUNTER (OUTPATIENT)
Dept: URBAN - METROPOLITAN AREA CLINIC 19 | Facility: CLINIC | Age: 32
End: 2022-04-23

## 2022-04-23 RX ORDER — PANTOPRAZOLE SODIUM 40 MG/1
1 TABLET TABLET, DELAYED RELEASE ORAL ONCE A DAY
Qty: 90 TABLET | Refills: 0
Start: 2021-11-23

## 2022-05-12 ENCOUNTER — LAB OUTSIDE AN ENCOUNTER (OUTPATIENT)
Dept: URBAN - METROPOLITAN AREA CLINIC 19 | Facility: CLINIC | Age: 32
End: 2022-05-12

## 2022-05-12 ENCOUNTER — OFFICE VISIT (OUTPATIENT)
Dept: URBAN - METROPOLITAN AREA CLINIC 19 | Facility: CLINIC | Age: 32
End: 2022-05-12
Payer: COMMERCIAL

## 2022-05-12 VITALS
HEART RATE: 52 BPM | TEMPERATURE: 98.7 F | DIASTOLIC BLOOD PRESSURE: 76 MMHG | OXYGEN SATURATION: 98 % | HEIGHT: 66 IN | BODY MASS INDEX: 29.57 KG/M2 | SYSTOLIC BLOOD PRESSURE: 128 MMHG | WEIGHT: 184 LBS

## 2022-05-12 DIAGNOSIS — R19.7 DIARRHEA OF PRESUMED INFECTIOUS ORIGIN: ICD-10-CM

## 2022-05-12 DIAGNOSIS — K70.30 ALCOHOLIC CIRRHOSIS OF LIVER WITHOUT ASCITES: ICD-10-CM

## 2022-05-12 DIAGNOSIS — A04.72 C. DIFFICILE COLITIS: ICD-10-CM

## 2022-05-12 PROBLEM — 423590009: Status: ACTIVE | Noted: 2022-05-12

## 2022-05-12 PROBLEM — 43240000: Status: ACTIVE | Noted: 2022-05-12

## 2022-05-12 PROCEDURE — 99213 OFFICE O/P EST LOW 20 MIN: CPT | Performed by: STUDENT IN AN ORGANIZED HEALTH CARE EDUCATION/TRAINING PROGRAM

## 2022-05-12 RX ORDER — ONDANSETRON 4 MG/1
1 TABLET ON THE TONGUE AND ALLOW TO DISSOLVE TABLET, ORALLY DISINTEGRATING ORAL
Qty: 40 | Refills: 1 | Status: ACTIVE | COMMUNITY
Start: 2021-10-26

## 2022-05-12 RX ORDER — PANTOPRAZOLE SODIUM 40 MG/1
1 TABLET TABLET, DELAYED RELEASE ORAL ONCE A DAY
Qty: 90 TABLET | Refills: 0 | Status: ACTIVE | COMMUNITY
Start: 2021-11-23

## 2022-05-12 RX ORDER — PROMETHAZINE HYDROCHLORIDE 12.5 MG/1
1 TABLET AS NEEDED TABLET ORAL
Qty: 60 TABLET | Refills: 1 | OUTPATIENT
Start: 2022-05-12 | End: 2022-07-11

## 2022-05-12 RX ORDER — NADOLOL 40 MG/1
1 TABLET TABLET ORAL ONCE A DAY
Qty: 90 TABLET | Refills: 3 | Status: ACTIVE | COMMUNITY
Start: 2021-05-04

## 2022-05-12 RX ORDER — FLUTICASONE FUROATE, UMECLIDINIUM BROMIDE AND VILANTEROL TRIFENATATE 100; 62.5; 25 UG/1; UG/1; UG/1
1 PUFF POWDER RESPIRATORY (INHALATION) ONCE A DAY
Status: ON HOLD | COMMUNITY

## 2022-05-12 NOTE — HPI-TODAY'S VISIT:
The patient is a 33 yo who is here for his 3 month f/u.  H/o alcoholic cirrhosis and hepatitis and c. diff infection during Dec/Zackary after which he was last followed up in clinic.  The diarrhea had resolved and it has started back again.  4 bms a day at least, with nausea and vomiting.  Has seen Dr. Orellana and she has tested him for c. diff.  At baseline 2 bms with regular formed bms.  no blood in stools.  Dr. MARLOW would like for us to weigh in on FMT given concerns for recurrent C. diff in this pt.  Sober over a month.  No drinks at all.  Before that 2-3 weeks of sobreity.  Then fell off the wagon.

## 2022-05-13 ENCOUNTER — LAB OUTSIDE AN ENCOUNTER (OUTPATIENT)
Dept: URBAN - METROPOLITAN AREA CLINIC 19 | Facility: CLINIC | Age: 32
End: 2022-05-13

## 2022-05-15 LAB
ABSOLUTE BASOPHIL COUNT: 0.06
ABSOLUTE EOSINOPHIL COUNT: 0.06
ABSOLUTE IMMATURE GRANULOCYTE  COUNT: 0.02
ABSOLUTE LYMPHOCYTE COUNT: 1.66
ABSOLUTE MONOCYTE COUNT: 0.43
ABSOLUTE NEUTROPHIL COUNT (ANC): 3.22
ABSOLUTE NRBC  COUNT: 0
AG RATIO: 2
ALBUMIN LEVEL: 4.1
ALK PHOS: 113
ALT: 36
ANION GAP: 11
AST: 49
BASOPHIL AUTO: 1
BILIRUBIN TOTAL: 1.2
BUN/CREAT RATIO: 9
BUN: 6
CALCIUM LEVEL: 9.4
CHLORIDE LEVEL: 105
CO2 LEVEL: 23
CREATININE LEVEL: 0.7
EOS AUTO: 1
GFR AFRICAN AMERICAN: >60
GFR NON AFRICAN AMERICAN: >60
GLUCOSE LEVEL: 88
HCT: 40.9
HGB: 12.9
IMMATURE GRANULOCYTES AUTO: 0.4
INR: 1.2
IPF: 18.3
LYMPH AUTO: 30
MCH: 29.8
MCHC: 31.5
MCV: 94.5
MONO AUTO: 8
MPV: 13.5
NEUTRO AUTO: 59
NRBC AUTO: 0
OSMO (CALC): 275
PERFORMING LAB: (no result)
PLATELETS: 133
POTASSIUM LEVEL: 3.5
PROTEIN TOTAL: 6.8
PT: 15
RBC: 4.33
RDW: 14.5
SLIDE REVIEW: (no result)
SODIUM LEVEL: 139
WBC: 5.4

## 2022-05-16 ENCOUNTER — WEB ENCOUNTER (OUTPATIENT)
Dept: URBAN - METROPOLITAN AREA CLINIC 19 | Facility: CLINIC | Age: 32
End: 2022-05-16

## 2022-05-20 ENCOUNTER — TELEPHONE ENCOUNTER (OUTPATIENT)
Dept: URBAN - METROPOLITAN AREA CLINIC 19 | Facility: CLINIC | Age: 32
End: 2022-05-20

## 2022-05-20 RX ORDER — FIDAXOMICIN 200 MG/1
1 TABLET TABLET, FILM COATED ORAL TWICE A DAY
Qty: 20 | Refills: 0 | OUTPATIENT
Start: 2022-05-20 | End: 2022-05-30

## 2022-06-02 ENCOUNTER — TELEPHONE ENCOUNTER (OUTPATIENT)
Dept: URBAN - METROPOLITAN AREA CLINIC 19 | Facility: CLINIC | Age: 32
End: 2022-06-02

## 2022-06-24 ENCOUNTER — LAB OUTSIDE AN ENCOUNTER (OUTPATIENT)
Dept: URBAN - METROPOLITAN AREA CLINIC 19 | Facility: CLINIC | Age: 32
End: 2022-06-24

## 2022-06-24 ENCOUNTER — OFFICE VISIT (OUTPATIENT)
Dept: URBAN - METROPOLITAN AREA CLINIC 19 | Facility: CLINIC | Age: 32
End: 2022-06-24
Payer: COMMERCIAL

## 2022-06-24 ENCOUNTER — TELEPHONE ENCOUNTER (OUTPATIENT)
Dept: URBAN - METROPOLITAN AREA CLINIC 19 | Facility: CLINIC | Age: 32
End: 2022-06-24

## 2022-06-24 VITALS
WEIGHT: 174 LBS | TEMPERATURE: 98.9 F | SYSTOLIC BLOOD PRESSURE: 142 MMHG | BODY MASS INDEX: 27.97 KG/M2 | HEIGHT: 66 IN | DIASTOLIC BLOOD PRESSURE: 76 MMHG

## 2022-06-24 DIAGNOSIS — A04.71 RECURRENT CLOSTRIDIOIDES DIFFICILE DIARRHEA: ICD-10-CM

## 2022-06-24 LAB
HEP B SURF AG: (no result)
HEP C AB: (no result)
Lab: (no result)
Lab: (no result)
PERFORMING LAB: (no result)

## 2022-06-24 PROCEDURE — 99213 OFFICE O/P EST LOW 20 MIN: CPT | Performed by: INTERNAL MEDICINE

## 2022-06-24 RX ORDER — PANTOPRAZOLE SODIUM 40 MG/1
1 TABLET TABLET, DELAYED RELEASE ORAL ONCE A DAY
Qty: 90 TABLET | Refills: 0 | Status: ACTIVE | COMMUNITY
Start: 2021-11-23

## 2022-06-24 RX ORDER — IMMUNE GLOB/PLASMA FRA BOVINE 5 G
1 PACKET POWDER IN PACKET (EA) ORAL DAILY
Qty: 60 | Refills: 1 | OUTPATIENT
Start: 2022-06-24 | End: 2022-10-22

## 2022-06-24 RX ORDER — ONDANSETRON 4 MG/1
1 TABLET ON THE TONGUE AND ALLOW TO DISSOLVE TABLET, ORALLY DISINTEGRATING ORAL
Qty: 40 | Refills: 1 | Status: ACTIVE | COMMUNITY
Start: 2021-10-26

## 2022-06-24 RX ORDER — FLUTICASONE FUROATE, UMECLIDINIUM BROMIDE AND VILANTEROL TRIFENATATE 100; 62.5; 25 UG/1; UG/1; UG/1
1 PUFF POWDER RESPIRATORY (INHALATION) ONCE A DAY
Status: DISCONTINUED | COMMUNITY

## 2022-06-24 RX ORDER — PROMETHAZINE HYDROCHLORIDE 12.5 MG/1
1 TABLET AS NEEDED TABLET ORAL
Qty: 60 TABLET | Refills: 1 | Status: ACTIVE | COMMUNITY
Start: 2022-05-12 | End: 2022-07-11

## 2022-06-24 RX ORDER — NADOLOL 40 MG/1
1 TABLET TABLET ORAL ONCE A DAY
Qty: 90 TABLET | Refills: 3 | Status: ACTIVE | COMMUNITY
Start: 2021-05-04

## 2022-06-24 RX ORDER — LACTOBACIL 2/BIFIDO 1/S.THERMO 900B CELL
1 PACKET PACKET (EA) ORAL DAILY
Qty: 60 | Refills: 1 | OUTPATIENT
Start: 2022-06-24 | End: 2022-10-22

## 2022-06-24 NOTE — HPI-TODAY'S VISIT:
5/12/22 (Dr. Roslyn Medley):  The patient is a 31 yo who is here for his 3 month f/u.  H/o alcoholic cirrhosis and hepatitis and c. diff infection during Dec/Zackary after which he was last followed up in clinic.  The diarrhea had resolved and it has started back again.  4 bms a day at least, with nausea and vomiting.  Has seen Dr. Orellana and she has tested him for c. diff.  At baseline 2 bms with regular formed bms.  no blood in stools.  Dr. MARLOW would like for us to weigh in on FMT given concerns for recurrent C. diff in this pt.  Sober over a month.  No drinks at all.  Before that 2-3 weeks of sobreity.  Then fell off the wagon.  6/24/22:  Patient presents for evaluation for FMT for recurrent Clostridium difficile, which is being recommended by his infectious disease specialist, Dr. Brooks.  He is still having issues with diarrhea despite multiple rounds of antibiotics.  We went over the natural history of C. difficile and the role of FMT.

## 2022-07-14 ENCOUNTER — WEB ENCOUNTER (OUTPATIENT)
Dept: URBAN - METROPOLITAN AREA CLINIC 19 | Facility: CLINIC | Age: 32
End: 2022-07-14

## 2022-07-14 ENCOUNTER — OFFICE VISIT (OUTPATIENT)
Dept: URBAN - METROPOLITAN AREA MEDICAL CENTER 28 | Facility: MEDICAL CENTER | Age: 32
End: 2022-07-14
Payer: COMMERCIAL

## 2022-07-14 DIAGNOSIS — A04.71 CLOSTRIDIUM DIFFICILE COLITIS: ICD-10-CM

## 2022-07-14 PROCEDURE — 44360 SMALL BOWEL ENDOSCOPY: CPT | Performed by: INTERNAL MEDICINE

## 2022-07-21 ENCOUNTER — WEB ENCOUNTER (OUTPATIENT)
Dept: URBAN - METROPOLITAN AREA CLINIC 19 | Facility: CLINIC | Age: 32
End: 2022-07-21

## 2022-07-26 ENCOUNTER — TELEPHONE ENCOUNTER (OUTPATIENT)
Dept: URBAN - METROPOLITAN AREA CLINIC 19 | Facility: CLINIC | Age: 32
End: 2022-07-26

## 2022-07-26 PROBLEM — 9953008 ACUTE ALCOHOLIC LIVER DISEASE: Status: ACTIVE | Noted: 2021-05-04

## 2022-07-26 PROBLEM — 266468003 CIRRHOSIS - NON-ALCOHOLIC: Status: ACTIVE | Noted: 2021-12-17

## 2022-08-02 ENCOUNTER — WEB ENCOUNTER (OUTPATIENT)
Dept: URBAN - METROPOLITAN AREA CLINIC 19 | Facility: CLINIC | Age: 32
End: 2022-08-02

## 2022-08-05 ENCOUNTER — LAB OUTSIDE AN ENCOUNTER (OUTPATIENT)
Dept: URBAN - METROPOLITAN AREA CLINIC 19 | Facility: CLINIC | Age: 32
End: 2022-08-05

## 2022-08-05 LAB
ABSOLUTE NRBC COUNT: 0
AG RATIO: 2
ALBUMIN LEVEL: 4.3
ALK PHOS: 119
ALT: 57
ANION GAP: 9
AST: 46
BILIRUBIN TOTAL: 0.7
BUN/CREAT RATIO: 13
BUN: 12
CALCIUM LEVEL: 9.2
CHLORIDE LEVEL: 102
CO2 LEVEL: 26
CREATININE LEVEL: 0.9
GFR 2021: >60
GLUCOSE LEVEL: 108
HCT: 42.6
HGB: 14.1
INR: 1.2
MCH: 29.3
MCHC: 33.1
MCV: 88.6
MPV: 12.7
NRBC AUTO: 0
OSMO (CALC): 274
PERFORMING LAB: (no result)
PLATELETS: 142
POTASSIUM LEVEL: 3.8
PROTEIN TOTAL: 7.2
PT: 15.5
RBC: 4.81
RDW: 14
SODIUM LEVEL: 137
WBC: 4.1

## 2022-08-06 LAB
AFP TUMOR MARKER: (no result)
PERFORMING LAB: (no result)

## 2022-08-10 ENCOUNTER — LAB OUTSIDE AN ENCOUNTER (OUTPATIENT)
Dept: URBAN - METROPOLITAN AREA CLINIC 19 | Facility: CLINIC | Age: 32
End: 2022-08-10

## 2022-08-26 ENCOUNTER — OFFICE VISIT (OUTPATIENT)
Dept: URBAN - METROPOLITAN AREA CLINIC 19 | Facility: CLINIC | Age: 32
End: 2022-08-26
Payer: COMMERCIAL

## 2022-08-26 VITALS
BODY MASS INDEX: 26.03 KG/M2 | SYSTOLIC BLOOD PRESSURE: 108 MMHG | DIASTOLIC BLOOD PRESSURE: 62 MMHG | TEMPERATURE: 98.9 F | WEIGHT: 162 LBS | HEIGHT: 66 IN

## 2022-08-26 DIAGNOSIS — D69.1 THROMBOCYTOPENIA: ICD-10-CM

## 2022-08-26 DIAGNOSIS — A04.71 RECURRENT CLOSTRIDIUM DIFFICILE DIARRHEA: ICD-10-CM

## 2022-08-26 DIAGNOSIS — K70.30 ALCOHOLIC CIRRHOSIS: ICD-10-CM

## 2022-08-26 DIAGNOSIS — D64.9 ANEMIA: ICD-10-CM

## 2022-08-26 PROBLEM — 271737000 ANEMIA: Status: ACTIVE | Noted: 2022-01-06

## 2022-08-26 PROBLEM — 240357000 CLOSTRIDIAL ENTERIC DISEASE: Status: ACTIVE | Noted: 2022-08-26

## 2022-08-26 PROBLEM — 420054005 ALCOHOLIC CIRRHOSIS: Status: ACTIVE | Noted: 2022-08-26

## 2022-08-26 PROCEDURE — 99213 OFFICE O/P EST LOW 20 MIN: CPT | Performed by: INTERNAL MEDICINE

## 2022-08-26 RX ORDER — ONDANSETRON 4 MG/1
1 TABLET ON THE TONGUE AND ALLOW TO DISSOLVE TABLET, ORALLY DISINTEGRATING ORAL
Qty: 40 | Refills: 1 | Status: ACTIVE | COMMUNITY
Start: 2021-10-26

## 2022-08-26 RX ORDER — NADOLOL 40 MG/1
1 TABLET TABLET ORAL ONCE A DAY
Qty: 90 TABLET | Refills: 3 | Status: ACTIVE | COMMUNITY
Start: 2021-05-04

## 2022-08-26 RX ORDER — IMMUNE GLOB/PLASMA FRA BOVINE 5 G
1 PACKET POWDER IN PACKET (EA) ORAL DAILY
Qty: 60 | Refills: 1 | Status: ACTIVE | COMMUNITY
Start: 2022-06-24 | End: 2022-10-22

## 2022-08-26 RX ORDER — LACTOBACIL 2/BIFIDO 1/S.THERMO 900B CELL
1 PACKET PACKET (EA) ORAL DAILY
Qty: 60 | Refills: 1 | Status: ACTIVE | COMMUNITY
Start: 2022-06-24 | End: 2022-10-22

## 2022-08-26 RX ORDER — PANTOPRAZOLE SODIUM 40 MG/1
1 TABLET TABLET, DELAYED RELEASE ORAL ONCE A DAY
Qty: 90 TABLET | Refills: 0 | Status: ACTIVE | COMMUNITY
Start: 2021-11-23

## 2022-08-26 NOTE — HPI-TODAY'S VISIT:
5/12/22 (Dr. Roslyn Medley):  The patient is a 31 yo who is here for his 3 month f/u.  H/o alcoholic cirrhosis and hepatitis and c. diff infection during Dec/Zackary after which he was last followed up in clinic.  The diarrhea had resolved and it has started back again.  4 bms a day at least, with nausea and vomiting.  Has seen Dr. Orellana and she has tested him for c. diff.  At baseline 2 bms with regular formed bms.  no blood in stools.  Dr. MARLOW would like for us to weigh in on FMT given concerns for recurrent C. diff in this pt.  Sober over a month.  No drinks at all.  Before that 2-3 weeks of sobreity.  Then fell off the wagon.  6/24/22:  Patient presents for evaluation for FMT for recurrent Clostridium difficile, which is being recommended by his infectious disease specialist, Dr. Brooks.  He is still having issues with diarrhea despite multiple rounds of antibiotics.  We went over the natural history of C. difficile and the role of FMT.  8/26/22:  Patient returns for reevaluation after I performed a small bowel enteroscopy with FMT for recurrent Clostridium difficile on 7/14/22.  The patient feels better overall with more energy, less bowel frequency, and increased bowel movement consistency.  He has been sober now for 6 months - labs have improved.

## 2022-11-22 ENCOUNTER — ERX REFILL RESPONSE (OUTPATIENT)
Dept: URBAN - METROPOLITAN AREA CLINIC 19 | Facility: CLINIC | Age: 32
End: 2022-11-22

## 2022-11-22 RX ORDER — PANTOPRAZOLE SODIUM 40 MG/1
1 TABLET TABLET, DELAYED RELEASE ORAL ONCE A DAY
Qty: 90 TABLET | Refills: 0 | OUTPATIENT

## 2022-11-22 RX ORDER — PANTOPRAZOLE SODIUM 40 MG/1
TAKE ONE TABLET BY MOUTH DAILY TABLET, DELAYED RELEASE ORAL
Qty: 90 TABLET | Refills: 0 | OUTPATIENT

## 2022-12-02 ENCOUNTER — OFFICE VISIT (OUTPATIENT)
Dept: URBAN - METROPOLITAN AREA CLINIC 19 | Facility: CLINIC | Age: 32
End: 2022-12-02
Payer: COMMERCIAL

## 2022-12-02 VITALS
SYSTOLIC BLOOD PRESSURE: 134 MMHG | HEIGHT: 66 IN | WEIGHT: 156.6 LBS | DIASTOLIC BLOOD PRESSURE: 76 MMHG | BODY MASS INDEX: 25.17 KG/M2

## 2022-12-02 DIAGNOSIS — K92.1 BLOOD IN STOOL: ICD-10-CM

## 2022-12-02 DIAGNOSIS — K70.30 ALCOHOLIC CIRRHOSIS OF LIVER WITHOUT ASCITES: ICD-10-CM

## 2022-12-02 DIAGNOSIS — R63.4 WEIGHT LOSS: ICD-10-CM

## 2022-12-02 DIAGNOSIS — D69.1 THROMBOCYTOPENIA: ICD-10-CM

## 2022-12-02 PROBLEM — 302215000 THROMBOCYTOPENIA: Status: ACTIVE | Noted: 2022-08-26

## 2022-12-02 PROBLEM — 405729008 BLOOD IN STOOL: Status: ACTIVE | Noted: 2022-12-02

## 2022-12-02 PROBLEM — 420054005: Status: ACTIVE | Noted: 2022-05-12

## 2022-12-02 PROBLEM — 816160009 WEIGHT LOSS: Status: ACTIVE | Noted: 2022-12-02

## 2022-12-02 PROCEDURE — 99213 OFFICE O/P EST LOW 20 MIN: CPT | Performed by: INTERNAL MEDICINE

## 2022-12-02 RX ORDER — PANTOPRAZOLE SODIUM 40 MG/1
TAKE ONE TABLET BY MOUTH DAILY TABLET, DELAYED RELEASE ORAL
Qty: 90 TABLET | Refills: 0 | Status: ACTIVE | COMMUNITY

## 2022-12-02 RX ORDER — NADOLOL 40 MG/1
1 TABLET TABLET ORAL ONCE A DAY
Qty: 90 TABLET | Refills: 3 | Status: ACTIVE | COMMUNITY
Start: 2021-05-04

## 2022-12-02 RX ORDER — ONDANSETRON 4 MG/1
1 TABLET ON THE TONGUE AND ALLOW TO DISSOLVE TABLET, ORALLY DISINTEGRATING ORAL
Qty: 40 | Refills: 1 | Status: ACTIVE | COMMUNITY
Start: 2021-10-26

## 2022-12-02 NOTE — HPI-TODAY'S VISIT:
5/12/22 (Dr. Roslyn Medley):  The patient is a 33 yo who is here for his 3 month f/u.  H/o alcoholic cirrhosis and hepatitis and c. diff infection during Dec/Zackary after which he was last followed up in clinic.  The diarrhea had resolved and it has started back again.  4 bms a day at least, with nausea and vomiting.  Has seen Dr. Orellana and she has tested him for c. diff.  At baseline 2 bms with regular formed bms.  no blood in stools.  Dr. MARLOW would like for us to weigh in on FMT given concerns for recurrent C. diff in this pt.  Sober over a month.  No drinks at all.  Before that 2-3 weeks of sobreity.  Then fell off the wagon.  6/24/22:  Patient presents for evaluation for FMT for recurrent Clostridium difficile, which is being recommended by his infectious disease specialist, Dr. Brooks.  He is still having issues with diarrhea despite multiple rounds of antibiotics.  We went over the natural history of C. difficile and the role of FMT.  8/26/22:  Patient returns for reevaluation after I performed a small bowel enteroscopy with FMT for recurrent Clostridium difficile on 7/14/22.  The patient feels better overall with more energy, less bowel frequency, and increased bowel movement consistency.  He has been sober now for 6 months - labs have improved.  12/2/22: Patient returns for reevaluation of his alcoholic cirrhosis. Labs drawn on 8/5/22 showed elevation in AST/ALT (46/57) and low platelets (142) and WBC (4.1), but otherwise, they were normal. Ultrasound showed a fatty liver but not cirrhotic. No masses. The patient's last EGD in February 2022 showed no varices. He feels well. His only complaint is minor rectal bleeding and weight loss.

## 2023-02-26 ENCOUNTER — WEB ENCOUNTER (OUTPATIENT)
Age: 33
End: 2023-02-26

## 2023-02-26 RX ORDER — PANTOPRAZOLE SODIUM 40 MG/1
TAKE ONE TABLET BY MOUTH DAILY TABLET, DELAYED RELEASE ORAL
Qty: 90 TABLET | Refills: 1

## 2023-06-01 ENCOUNTER — LAB OUTSIDE AN ENCOUNTER (OUTPATIENT)
Dept: URBAN - METROPOLITAN AREA CLINIC 19 | Facility: CLINIC | Age: 33
End: 2023-06-01

## 2023-06-01 ENCOUNTER — OFFICE VISIT (OUTPATIENT)
Dept: URBAN - METROPOLITAN AREA CLINIC 19 | Facility: CLINIC | Age: 33
End: 2023-06-01
Payer: COMMERCIAL

## 2023-06-01 VITALS
HEART RATE: 51 BPM | WEIGHT: 142.6 LBS | BODY MASS INDEX: 22.92 KG/M2 | HEIGHT: 66 IN | TEMPERATURE: 98.2 F | SYSTOLIC BLOOD PRESSURE: 132 MMHG | DIASTOLIC BLOOD PRESSURE: 78 MMHG

## 2023-06-01 DIAGNOSIS — D69.1 THROMBOCYTOPENIA: ICD-10-CM

## 2023-06-01 DIAGNOSIS — Z87.19 HISTORY OF ESOPHAGEAL VARICES: ICD-10-CM

## 2023-06-01 DIAGNOSIS — K70.30 ALCOHOLIC CIRRHOSIS: ICD-10-CM

## 2023-06-01 PROCEDURE — 99214 OFFICE O/P EST MOD 30 MIN: CPT | Performed by: INTERNAL MEDICINE

## 2023-06-01 RX ORDER — ONDANSETRON 4 MG/1
1 TABLET ON THE TONGUE AND ALLOW TO DISSOLVE TABLET, ORALLY DISINTEGRATING ORAL
Qty: 40 | Refills: 1 | Status: ACTIVE | COMMUNITY
Start: 2021-10-26

## 2023-06-01 RX ORDER — NADOLOL 40 MG/1
1 TABLET TABLET ORAL ONCE A DAY
Qty: 90 TABLET | Refills: 3 | Status: ACTIVE | COMMUNITY
Start: 2021-05-04

## 2023-06-01 RX ORDER — PANTOPRAZOLE SODIUM 40 MG/1
TAKE ONE TABLET BY MOUTH DAILY TABLET, DELAYED RELEASE ORAL
Qty: 90 TABLET | Refills: 1 | Status: ACTIVE | COMMUNITY

## 2023-06-01 NOTE — HPI-TODAY'S VISIT:
5/12/22 (Dr. Roslyn Medley):  The patient is a 31 yo who is here for his 3 month f/u.  H/o alcoholic cirrhosis and hepatitis and c. diff infection during Dec/Zackary after which he was last followed up in clinic.  The diarrhea had resolved and it has started back again.  4 bms a day at least, with nausea and vomiting.  Has seen Dr. Orellana and she has tested him for c. diff.  At baseline 2 bms with regular formed bms.  no blood in stools.  Dr. MARLOW would like for us to weigh in on FMT given concerns for recurrent C. diff in this pt.  Sober over a month.  No drinks at all.  Before that 2-3 weeks of sobreity.  Then fell off the wagon.  6/24/22:  Patient presents for evaluation for FMT for recurrent Clostridium difficile, which is being recommended by his infectious disease specialist, Dr. Boroks.  He is still having issues with diarrhea despite multiple rounds of antibiotics.  We went over the natural history of C. difficile and the role of FMT.  8/26/22:  Patient returns for reevaluation after I performed a small bowel enteroscopy with FMT for recurrent Clostridium difficile on 7/14/22.  The patient feels better overall with more energy, less bowel frequency, and increased bowel movement consistency.  He has been sober now for 6 months - labs have improved.  12/2/22: Patient returns for reevaluation of his alcoholic cirrhosis. Labs drawn on 8/5/22 showed elevation in AST/ALT (46/57) and low platelets (142) and WBC (4.1), but otherwise, they were normal. Ultrasound showed a fatty liver but not cirrhotic. No masses. The patient's last EGD in February 2022 showed no varices. He feels well. His only complaint is minor rectal bleeding and weight loss.  6/1/23: Patient presents for reevaluation of his alcohol-induced cirrhosis. He has maintained abstinence. He feels well. His only complaint is occasional bleeding around the umbilicus, but it may be skin irritation. It looks normal to me today. He has intermittent nausea/vomiting in the morning. Possible reflux? Due for variceal screening and HCC screening.

## 2023-06-02 LAB
A/G RATIO: 2
AFP, SERUM, TUMOR MARKER: 2.1
ALBUMIN: 4.6
ALKALINE PHOSPHATASE: 105
ALT (SGPT): 34
AST (SGOT): 30
BILIRUBIN, TOTAL: 0.6
BUN/CREATININE RATIO: (no result)
BUN: 16
CALCIUM: 9.4
CARBON DIOXIDE, TOTAL: 29
CHLORIDE: 105
CREATININE: 0.88
EGFR: 117
GLOBULIN, TOTAL: 2.3
GLUCOSE: 91
HEMATOCRIT: 44.5
HEMOGLOBIN: 14.9
INR: 1.1
MCH: 30.2
MCHC: 33.5
MCV: 90.1
MPV: 12.3
PLATELET COUNT: 167
POTASSIUM: 4.3
PROTEIN, TOTAL: 6.9
PT: 11.7
RDW: 12.4
RED BLOOD CELL COUNT: 4.94
SODIUM: 141
WHITE BLOOD CELL COUNT: 7.3

## 2023-06-08 ENCOUNTER — LAB OUTSIDE AN ENCOUNTER (OUTPATIENT)
Dept: URBAN - METROPOLITAN AREA CLINIC 19 | Facility: CLINIC | Age: 33
End: 2023-06-08

## 2023-07-28 ENCOUNTER — OFFICE VISIT (OUTPATIENT)
Dept: URBAN - METROPOLITAN AREA MEDICAL CENTER 25 | Facility: MEDICAL CENTER | Age: 33
End: 2023-07-28
Payer: COMMERCIAL

## 2023-07-28 DIAGNOSIS — K74.69 CIRRHOSIS, CRYPTOGENIC: ICD-10-CM

## 2023-07-28 PROCEDURE — 43235 EGD DIAGNOSTIC BRUSH WASH: CPT | Performed by: INTERNAL MEDICINE

## 2023-07-28 RX ORDER — NADOLOL 40 MG/1
1 TABLET TABLET ORAL ONCE A DAY
Qty: 90 TABLET | Refills: 3 | Status: ACTIVE | COMMUNITY
Start: 2021-05-04

## 2023-07-28 RX ORDER — PANTOPRAZOLE SODIUM 40 MG/1
TAKE ONE TABLET BY MOUTH DAILY TABLET, DELAYED RELEASE ORAL
Qty: 90 TABLET | Refills: 1 | Status: ACTIVE | COMMUNITY

## 2023-07-28 RX ORDER — ONDANSETRON 4 MG/1
1 TABLET ON THE TONGUE AND ALLOW TO DISSOLVE TABLET, ORALLY DISINTEGRATING ORAL
Qty: 40 | Refills: 1 | Status: ACTIVE | COMMUNITY
Start: 2021-10-26

## 2023-12-15 ENCOUNTER — OFFICE VISIT (OUTPATIENT)
Dept: URBAN - METROPOLITAN AREA CLINIC 19 | Facility: CLINIC | Age: 33
End: 2023-12-15

## 2024-01-22 ENCOUNTER — DASHBOARD ENCOUNTERS (OUTPATIENT)
Age: 34
End: 2024-01-22

## 2024-01-23 ENCOUNTER — TELEPHONE ENCOUNTER (OUTPATIENT)
Dept: URBAN - METROPOLITAN AREA CLINIC 19 | Facility: CLINIC | Age: 34
End: 2024-01-23

## 2024-01-23 ENCOUNTER — OFFICE VISIT (OUTPATIENT)
Dept: URBAN - METROPOLITAN AREA CLINIC 19 | Facility: CLINIC | Age: 34
End: 2024-01-23
Payer: COMMERCIAL

## 2024-01-23 VITALS
HEART RATE: 59 BPM | WEIGHT: 156 LBS | DIASTOLIC BLOOD PRESSURE: 70 MMHG | TEMPERATURE: 97.1 F | SYSTOLIC BLOOD PRESSURE: 130 MMHG | BODY MASS INDEX: 25.07 KG/M2 | HEIGHT: 66 IN

## 2024-01-23 DIAGNOSIS — R11.0 NAUSEA: ICD-10-CM

## 2024-01-23 DIAGNOSIS — R10.11 RUQ PAIN: ICD-10-CM

## 2024-01-23 DIAGNOSIS — Z86.19 HISTORY OF CLOSTRIDIUM DIFFICILE COLITIS: ICD-10-CM

## 2024-01-23 DIAGNOSIS — R19.7 DIARRHEA, UNSPECIFIED TYPE: ICD-10-CM

## 2024-01-23 DIAGNOSIS — K70.30 ALCOHOLIC CIRRHOSIS OF LIVER WITHOUT ASCITES: ICD-10-CM

## 2024-01-23 DIAGNOSIS — R10.13 DYSPEPSIA: ICD-10-CM

## 2024-01-23 PROCEDURE — 99214 OFFICE O/P EST MOD 30 MIN: CPT | Performed by: INTERNAL MEDICINE

## 2024-01-23 RX ORDER — PANTOPRAZOLE SODIUM 40 MG/1
1 TABLET TABLET, DELAYED RELEASE ORAL TWICE A DAY
Qty: 180 TABLET | Refills: 3 | OUTPATIENT
Start: 2024-01-23

## 2024-01-23 RX ORDER — FAMOTIDINE 40 MG/1
1 TABLET AT BEDTIME TABLET, FILM COATED ORAL ONCE A DAY
Qty: 90 TABLET | Refills: 3 | OUTPATIENT
Start: 2024-01-23

## 2024-01-23 RX ORDER — ONDANSETRON 4 MG/1
1 TABLET ON THE TONGUE AND ALLOW TO DISSOLVE TABLET, ORALLY DISINTEGRATING ORAL
Qty: 40 | Refills: 1 | Status: ACTIVE | COMMUNITY
Start: 2021-10-26

## 2024-01-23 RX ORDER — NADOLOL 40 MG/1
1 TABLET TABLET ORAL ONCE A DAY
Qty: 90 TABLET | Refills: 3 | Status: ACTIVE | COMMUNITY
Start: 2021-05-04

## 2024-01-23 RX ORDER — PANTOPRAZOLE SODIUM 40 MG/1
TAKE ONE TABLET BY MOUTH DAILY TABLET, DELAYED RELEASE ORAL
Qty: 90 TABLET | Refills: 1 | Status: ACTIVE | COMMUNITY

## 2024-01-23 NOTE — HPI-TODAY'S VISIT:
5/12/22 (Dr. Roslyn Medley):  The patient is a 31 yo who is here for his 3 month f/u.  H/o alcoholic cirrhosis and hepatitis and c. diff infection during Dec/Zackary after which he was last followed up in clinic.  The diarrhea had resolved and it has started back again.  4 bms a day at least, with nausea and vomiting.  Has seen Dr. Orellana and she has tested him for c. diff.  At baseline 2 bms with regular formed bms.  no blood in stools.  Dr. MARLOW would like for us to weigh in on FMT given concerns for recurrent C. diff in this pt.  Sober over a month.  No drinks at all.  Before that 2-3 weeks of sobreity.  Then fell off the wagon.  6/24/22:  Patient presents for evaluation for FMT for recurrent Clostridium difficile, which is being recommended by his infectious disease specialist, Dr. Brooks.  He is still having issues with diarrhea despite multiple rounds of antibiotics.  We went over the natural history of C. difficile and the role of FMT.  8/26/22:  Patient returns for reevaluation after I performed a small bowel enteroscopy with FMT for recurrent Clostridium difficile on 7/14/22.  The patient feels better overall with more energy, less bowel frequency, and increased bowel movement consistency.  He has been sober now for 6 months - labs have improved.  12/2/22: Patient returns for reevaluation of his alcoholic cirrhosis. Labs drawn on 8/5/22 showed elevation in AST/ALT (46/57) and low platelets (142) and WBC (4.1), but otherwise, they were normal. Ultrasound showed a fatty liver but not cirrhotic. No masses. The patient's last EGD in February 2022 showed no varices. He feels well. His only complaint is minor rectal bleeding and weight loss.  6/1/23: Patient presents for reevaluation of his alcohol-induced cirrhosis. He has maintained abstinence. He feels well. His only complaint is occasional bleeding around the umbilicus, but it may be skin irritation. It looks normal to me today. He has intermittent nausea/vomiting in the morning. Possible reflux? Due for variceal screening and HCC screening.  1/23/24: Patient presents for reevaluation of his alcohol-induced cirrhosis as well as increased abdominal pain. Since I last saw the patient, he had labs drawn (all within normal limits), a RUQ ultrasound performed for HCC screening, and an EGD for variceal screening. His ultrasound showed a fatty liver with signs of portal hypertension/splenomegaly. Gallbladder was normal. His EGD showed no signs of portal hypertension (no varices, GAVE, or PHG). There was no esophagitis, gastritis, or duodenitis.  The patient states that he had some dental work done about a month ago and received antibiotics. He had diarrhea for a little while, but this resolved. His main complaint is nausea in the morning, but this has been present "for years"; sometimes he has dry heaves. He also has had intermittent RUQ pain since December 2023. This occurs about twice a week and resolves spontaneously. He is supposed to start antibiotics for a possible lung infection since his pulmonologist, Dr. Marco A Roberts, has been working him up for intermittent hypoxia and felt that this may help. I am concerned about the possible recurrence of C. difficile.

## 2024-01-26 LAB
A/G RATIO: 2.1
AFP, SERUM: 1.4
AFP-L3%, SERUM: (no result)
ALBUMIN: 4.9
ALKALINE PHOSPHATASE: 60
ALT (SGPT): 27
AST (SGOT): 25
BILIRUBIN, TOTAL: 0.6
BUN/CREATININE RATIO: (no result)
BUN: 13
CALCIUM: 9.7
CARBON DIOXIDE, TOTAL: 25
CHLORIDE: 102
CREATININE: 0.78
EGFR: 122
GLOBULIN, TOTAL: 2.3
GLUCOSE: 110
HEMATOCRIT: 47.6
HEMOGLOBIN: 16.3
INR: 1.1
MCH: 30.8
MCHC: 34.2
MCV: 89.8
MPV: 12.8
PLATELET COUNT: 168
POTASSIUM: 3.9
PROTEIN, TOTAL: 7.2
PT: 11.4
RDW: 12.5
RED BLOOD CELL COUNT: 5.3
SODIUM: 139
WHITE BLOOD CELL COUNT: 6.7

## 2024-01-31 ENCOUNTER — OFFICE VISIT (OUTPATIENT)
Dept: URBAN - METROPOLITAN AREA CLINIC 18 | Facility: CLINIC | Age: 34
End: 2024-01-31
Payer: COMMERCIAL

## 2024-01-31 DIAGNOSIS — K70.30 ALCOHOLIC CIRRHOSIS: ICD-10-CM

## 2024-01-31 PROCEDURE — 76705 ECHO EXAM OF ABDOMEN: CPT | Performed by: INTERNAL MEDICINE

## 2024-01-31 RX ORDER — ONDANSETRON 4 MG/1
1 TABLET ON THE TONGUE AND ALLOW TO DISSOLVE TABLET, ORALLY DISINTEGRATING ORAL
Qty: 40 | Refills: 1 | Status: ACTIVE | COMMUNITY
Start: 2021-10-26

## 2024-01-31 RX ORDER — PANTOPRAZOLE SODIUM 40 MG/1
1 TABLET TABLET, DELAYED RELEASE ORAL TWICE A DAY
Qty: 180 TABLET | Refills: 3 | Status: ACTIVE | COMMUNITY
Start: 2024-01-23

## 2024-01-31 RX ORDER — FAMOTIDINE 40 MG/1
1 TABLET AT BEDTIME TABLET, FILM COATED ORAL ONCE A DAY
Qty: 90 TABLET | Refills: 3 | Status: ACTIVE | COMMUNITY
Start: 2024-01-23

## 2024-01-31 RX ORDER — PANTOPRAZOLE SODIUM 40 MG/1
TAKE ONE TABLET BY MOUTH DAILY TABLET, DELAYED RELEASE ORAL
Qty: 90 TABLET | Refills: 1 | Status: ACTIVE | COMMUNITY

## 2024-01-31 RX ORDER — NADOLOL 40 MG/1
1 TABLET TABLET ORAL ONCE A DAY
Qty: 90 TABLET | Refills: 3 | Status: ACTIVE | COMMUNITY
Start: 2021-05-04

## 2024-07-30 ENCOUNTER — OFFICE VISIT (OUTPATIENT)
Dept: URBAN - METROPOLITAN AREA CLINIC 19 | Facility: CLINIC | Age: 34
End: 2024-07-30

## 2024-08-15 ENCOUNTER — OFFICE VISIT (OUTPATIENT)
Dept: URBAN - METROPOLITAN AREA CLINIC 19 | Facility: CLINIC | Age: 34
End: 2024-08-15
Payer: COMMERCIAL

## 2024-08-15 ENCOUNTER — LAB OUTSIDE AN ENCOUNTER (OUTPATIENT)
Dept: URBAN - METROPOLITAN AREA CLINIC 19 | Facility: CLINIC | Age: 34
End: 2024-08-15

## 2024-08-15 VITALS
WEIGHT: 164 LBS | DIASTOLIC BLOOD PRESSURE: 84 MMHG | HEIGHT: 66 IN | SYSTOLIC BLOOD PRESSURE: 122 MMHG | BODY MASS INDEX: 26.36 KG/M2 | HEART RATE: 71 BPM | TEMPERATURE: 97.2 F | OXYGEN SATURATION: 98 %

## 2024-08-15 DIAGNOSIS — K70.30 ALCOHOLIC CIRRHOSIS OF LIVER WITHOUT ASCITES: ICD-10-CM

## 2024-08-15 DIAGNOSIS — D69.1 THROMBOCYTOPENIA: ICD-10-CM

## 2024-08-15 DIAGNOSIS — R10.11 RUQ PAIN: ICD-10-CM

## 2024-08-15 DIAGNOSIS — Z87.19 HISTORY OF ESOPHAGEAL VARICES: ICD-10-CM

## 2024-08-15 PROCEDURE — 99214 OFFICE O/P EST MOD 30 MIN: CPT | Performed by: INTERNAL MEDICINE

## 2024-08-15 RX ORDER — FAMOTIDINE 40 MG/1
1 TABLET AT BEDTIME TABLET, FILM COATED ORAL ONCE A DAY
Qty: 90 TABLET | Refills: 3 | Status: ACTIVE | COMMUNITY
Start: 2024-01-23

## 2024-08-15 RX ORDER — PANTOPRAZOLE SODIUM 40 MG/1
1 TABLET TABLET, DELAYED RELEASE ORAL TWICE A DAY
Qty: 180 TABLET | Refills: 3 | Status: ACTIVE | COMMUNITY
Start: 2024-01-23

## 2024-08-15 RX ORDER — PANTOPRAZOLE SODIUM 40 MG/1
TAKE ONE TABLET BY MOUTH DAILY TABLET, DELAYED RELEASE ORAL
Qty: 90 TABLET | Refills: 1 | Status: ACTIVE | COMMUNITY

## 2024-08-15 RX ORDER — NADOLOL 40 MG/1
1 TABLET TABLET ORAL ONCE A DAY
Qty: 90 TABLET | Refills: 3 | Status: ACTIVE | COMMUNITY
Start: 2021-05-04

## 2024-08-15 RX ORDER — ONDANSETRON 4 MG/1
1 TABLET ON THE TONGUE AND ALLOW TO DISSOLVE TABLET, ORALLY DISINTEGRATING ORAL
Qty: 40 | Refills: 1 | Status: ACTIVE | COMMUNITY
Start: 2021-10-26

## 2024-08-15 NOTE — HPI-TODAY'S VISIT:
5/12/22 (Dr. Roslyn Medley):  The patient is a 33 yo who is here for his 3 month f/u.  H/o alcoholic cirrhosis and hepatitis and c. diff infection during Dec/Zackary after which he was last followed up in clinic.  The diarrhea had resolved and it has started back again.  4 bms a day at least, with nausea and vomiting.  Has seen Dr. Orellana and she has tested him for c. diff.  At baseline 2 bms with regular formed bms.  no blood in stools.  Dr. MARLOW would like for us to weigh in on FMT given concerns for recurrent C. diff in this pt.  Sober over a month.  No drinks at all.  Before that 2-3 weeks of sobreity.  Then fell off the wagon.  6/24/22:  Patient presents for evaluation for FMT for recurrent Clostridium difficile, which is being recommended by his infectious disease specialist, Dr. Brooks.  He is still having issues with diarrhea despite multiple rounds of antibiotics.  We went over the natural history of C. difficile and the role of FMT.  8/26/22:  Patient returns for reevaluation after I performed a small bowel enteroscopy with FMT for recurrent Clostridium difficile on 7/14/22.  The patient feels better overall with more energy, less bowel frequency, and increased bowel movement consistency.  He has been sober now for 6 months - labs have improved.  12/2/22: Patient returns for reevaluation of his alcoholic cirrhosis. Labs drawn on 8/5/22 showed elevation in AST/ALT (46/57) and low platelets (142) and WBC (4.1), but otherwise, they were normal. Ultrasound showed a fatty liver but not cirrhotic. No masses. The patient's last EGD in February 2022 showed no varices. He feels well. His only complaint is minor rectal bleeding and weight loss.  6/1/23: Patient presents for reevaluation of his alcohol-induced cirrhosis. He has maintained abstinence. He feels well. His only complaint is occasional bleeding around the umbilicus, but it may be skin irritation. It looks normal to me today. He has intermittent nausea/vomiting in the morning. Possible reflux? Due for variceal screening and HCC screening.  1/23/24: Patient presents for reevaluation of his alcohol-induced cirrhosis as well as increased abdominal pain. Since I last saw the patient, he had labs drawn (all within normal limits), a RUQ ultrasound performed for HCC screening, and an EGD for variceal screening. His ultrasound showed a fatty liver with signs of portal hypertension/splenomegaly. Gallbladder was normal. His EGD showed no signs of portal hypertension (no varices, GAVE, or PHG). There was no esophagitis, gastritis, or duodenitis.   The patient states that he had some dental work done about a month ago and received antibiotics. He had diarrhea for a little while, but this resolved. His main complaint is nausea in the morning, but this has been present "for years"; sometimes he has dry heaves. He also has had intermittent RUQ pain since December 2023. This occurs about twice a week and resolves spontaneously. He is supposed to start antibiotics for a possible lung infection since his pulmonologist, Dr. Marco A Roberts, has been working him up for intermittent hypoxia and felt that this may help. I am concerned about the possible recurrence of C. difficile.   8/15/24: Patient returns for reevalation of his alcohol-induced cirrhosis. He feels well overall but still has a vague abdominal pain that is not post-prandial. No distension. He is due for HCC screening - will order CT liver to also evaluate his abdominal discomfort. Due for variceal screening. States that he has maintained abstinence.

## 2024-08-23 LAB
A/G RATIO: 1.8
AFP, SERUM: 1.4
AFP-L3%, SERUM: (no result)
ALBUMIN: 4.6
ALKALINE PHOSPHATASE: 59
ALT (SGPT): 28
AST (SGOT): 22
BILIRUBIN, TOTAL: 0.6
BUN/CREATININE RATIO: (no result)
BUN: 8
CALCIUM: 9.4
CARBON DIOXIDE, TOTAL: 27
CHLORIDE: 103
CREATININE: 0.84
EGFR: 118
GLOBULIN, TOTAL: 2.5
GLUCOSE: 93
HEMATOCRIT: 47
HEMOGLOBIN: 15.9
INR: 1
MCH: 31.1
MCHC: 33.8
MCV: 91.8
MPV: 12.4
PLATELET COUNT: 192
POTASSIUM: 4.1
PROTEIN, TOTAL: 7.1
PT: 11
RDW: 12.3
RED BLOOD CELL COUNT: 5.12
SODIUM: 140
WHITE BLOOD CELL COUNT: 8.3

## 2024-08-26 ENCOUNTER — WEB ENCOUNTER (OUTPATIENT)
Dept: URBAN - METROPOLITAN AREA CLINIC 19 | Facility: CLINIC | Age: 34
End: 2024-08-26

## 2024-09-16 ENCOUNTER — OFFICE VISIT (OUTPATIENT)
Dept: URBAN - METROPOLITAN AREA SURGERY CENTER 31 | Facility: SURGERY CENTER | Age: 34
End: 2024-09-16

## 2024-09-16 RX ORDER — PANTOPRAZOLE SODIUM 40 MG/1
1 TABLET TABLET, DELAYED RELEASE ORAL TWICE A DAY
Qty: 180 TABLET | Refills: 3 | Status: ACTIVE | COMMUNITY
Start: 2024-01-23

## 2024-09-16 RX ORDER — FAMOTIDINE 40 MG/1
1 TABLET AT BEDTIME TABLET, FILM COATED ORAL ONCE A DAY
Qty: 90 TABLET | Refills: 3 | Status: ACTIVE | COMMUNITY
Start: 2024-01-23

## 2024-09-16 RX ORDER — ONDANSETRON 4 MG/1
1 TABLET ON THE TONGUE AND ALLOW TO DISSOLVE TABLET, ORALLY DISINTEGRATING ORAL
Qty: 40 | Refills: 1 | Status: ACTIVE | COMMUNITY
Start: 2021-10-26

## 2024-09-16 RX ORDER — PANTOPRAZOLE SODIUM 40 MG/1
TAKE ONE TABLET BY MOUTH DAILY TABLET, DELAYED RELEASE ORAL
Qty: 90 TABLET | Refills: 1 | Status: ACTIVE | COMMUNITY

## 2024-09-16 RX ORDER — NADOLOL 40 MG/1
1 TABLET TABLET ORAL ONCE A DAY
Qty: 90 TABLET | Refills: 3 | Status: ACTIVE | COMMUNITY
Start: 2021-05-04

## 2024-10-02 ENCOUNTER — WEB ENCOUNTER (OUTPATIENT)
Dept: URBAN - METROPOLITAN AREA CLINIC 19 | Facility: CLINIC | Age: 34
End: 2024-10-02

## 2024-10-15 ENCOUNTER — OFFICE VISIT (OUTPATIENT)
Dept: URBAN - METROPOLITAN AREA MEDICAL CENTER 25 | Facility: MEDICAL CENTER | Age: 34
End: 2024-10-15
Payer: COMMERCIAL

## 2024-10-15 ENCOUNTER — WEB ENCOUNTER (OUTPATIENT)
Dept: URBAN - METROPOLITAN AREA CLINIC 19 | Facility: CLINIC | Age: 34
End: 2024-10-15

## 2024-10-15 DIAGNOSIS — K74.69 CIRRHOSIS, CRYPTOGENIC: ICD-10-CM

## 2024-10-15 PROCEDURE — 43235 EGD DIAGNOSTIC BRUSH WASH: CPT | Performed by: INTERNAL MEDICINE

## 2024-10-15 RX ORDER — ONDANSETRON 4 MG/1
1 TABLET ON THE TONGUE AND ALLOW TO DISSOLVE TABLET, ORALLY DISINTEGRATING ORAL
Qty: 40 | Refills: 1 | Status: ACTIVE | COMMUNITY
Start: 2021-10-26

## 2024-10-15 RX ORDER — PANTOPRAZOLE SODIUM 40 MG/1
TAKE ONE TABLET BY MOUTH DAILY TABLET, DELAYED RELEASE ORAL
Qty: 90 TABLET | Refills: 1 | Status: ACTIVE | COMMUNITY

## 2024-10-15 RX ORDER — NADOLOL 40 MG/1
1 TABLET TABLET ORAL ONCE A DAY
Qty: 90 TABLET | Refills: 3 | Status: ACTIVE | COMMUNITY
Start: 2021-05-04

## 2024-10-15 RX ORDER — PANTOPRAZOLE SODIUM 40 MG/1
1 TABLET TABLET, DELAYED RELEASE ORAL TWICE A DAY
Qty: 180 TABLET | Refills: 3 | Status: ACTIVE | COMMUNITY
Start: 2024-01-23

## 2024-10-15 RX ORDER — FAMOTIDINE 40 MG/1
1 TABLET AT BEDTIME TABLET, FILM COATED ORAL ONCE A DAY
Qty: 90 TABLET | Refills: 3 | Status: ACTIVE | COMMUNITY
Start: 2024-01-23

## 2025-02-18 ENCOUNTER — OFFICE VISIT (OUTPATIENT)
Dept: URBAN - METROPOLITAN AREA CLINIC 19 | Facility: CLINIC | Age: 35
End: 2025-02-18

## 2025-02-18 RX ORDER — PANTOPRAZOLE SODIUM 40 MG/1
1 TABLET TABLET, DELAYED RELEASE ORAL TWICE A DAY
Qty: 180 TABLET | Refills: 3 | Status: DISCONTINUED | COMMUNITY
Start: 2024-01-23

## 2025-02-18 RX ORDER — ONDANSETRON 4 MG/1
1 TABLET ON THE TONGUE AND ALLOW TO DISSOLVE TABLET, ORALLY DISINTEGRATING ORAL
Qty: 40 | Refills: 1 | Status: ACTIVE | COMMUNITY
Start: 2021-10-26

## 2025-02-18 RX ORDER — PANTOPRAZOLE SODIUM 40 MG/1
TAKE ONE TABLET BY MOUTH DAILY TABLET, DELAYED RELEASE ORAL
Qty: 90 TABLET | Refills: 1 | Status: ACTIVE | COMMUNITY

## 2025-02-18 RX ORDER — FAMOTIDINE 40 MG/1
1 TABLET AT BEDTIME TABLET, FILM COATED ORAL ONCE A DAY
Qty: 90 TABLET | Refills: 3 | Status: DISCONTINUED | COMMUNITY
Start: 2024-01-23

## 2025-02-18 RX ORDER — NADOLOL 40 MG/1
1 TABLET TABLET ORAL ONCE A DAY
Qty: 90 TABLET | Refills: 3 | Status: DISCONTINUED | COMMUNITY
Start: 2021-05-04

## 2025-03-21 ENCOUNTER — OFFICE VISIT (OUTPATIENT)
Dept: URBAN - METROPOLITAN AREA CLINIC 19 | Facility: CLINIC | Age: 35
End: 2025-03-21

## 2025-04-16 NOTE — PHYSICAL EXAM GASTROINTESTINAL
Abdomen , soft, nontender, nondistended , no guarding or rigidity , no masses palpable , normal bowel sounds , Liver and Spleen , no hepatomegaly present , no hepatosplenomegaly , liver nontender , spleen not palpable Rectal deferred Brief PA note  Vascular Surgery     Right IJ tunnelled catheter removed  region prepped with betadine  10cc site circumferential lidocaine block administered  Catheter removed with steady sustained manual traction   No instrumentation was needed  Pressure held at the IJ insertion site for 12 minutes  Clean dressing applied      -- may shower  -- dressing can be removed after 48 hours  - cleared for discharge from our standpoint

## 2025-06-24 ENCOUNTER — WEB ENCOUNTER (OUTPATIENT)
Dept: URBAN - METROPOLITAN AREA CLINIC 19 | Facility: CLINIC | Age: 35
End: 2025-06-24

## 2025-06-30 ENCOUNTER — OFFICE VISIT (OUTPATIENT)
Dept: URBAN - METROPOLITAN AREA CLINIC 19 | Facility: CLINIC | Age: 35
End: 2025-06-30

## 2025-06-30 RX ORDER — ONDANSETRON 4 MG/1
1 TABLET ON THE TONGUE AND ALLOW TO DISSOLVE TABLET, ORALLY DISINTEGRATING ORAL
Qty: 40 | Refills: 1 | Status: DISCONTINUED | COMMUNITY
Start: 2021-10-26

## 2025-06-30 RX ORDER — PANTOPRAZOLE SODIUM 40 MG/1
TAKE ONE TABLET BY MOUTH DAILY TABLET, DELAYED RELEASE ORAL
Qty: 90 TABLET | Refills: 1 | Status: ACTIVE | COMMUNITY

## 2025-06-30 RX ORDER — LACTULOSE 20 G/20G
1 PACKET POWDER, FOR SOLUTION ORAL
Qty: 30 | Refills: 5 | OUTPATIENT
Start: 2025-06-30

## 2025-06-30 NOTE — HPI-TODAY'S VISIT:
Mr. Deng is a 35-year-old male with history of recurrent C. difficile and alcoholic cirrhosis who presents today for follow-up.  Last seen in clinic by Dr. Black on 8/15/2024 with RUQ pain.  On 6/19/2025 CT A/P without contrast noting morphologic changes of underlying liver disease, possibly early cirrhosis.  Evaluation for underlying lesions is limited without IV contrast.  Prominent right mesenteric lymph nodes are nonspecific.  He is accompanied by his mother and reports he has been sober for 3.5 years.  He continues to have intermittent RUQ pain his mother reports he recently developed mental fogginess. Saw PCP a few months ago who recommended a CT but due to a lapse in insurance, he was recently able to have it done. 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 Admits to eating fast foods daily.  Previous GI workup: 2/12/2022 EGD by Dr. Hatch noted grade I esophageal varices, and portal hypertensive gastropathy.  No specimens collected. 2/24/2022 EGD done for hematemesis by Dr. Black was normal and no specimens collected. 7/14/2022 fecal transplant due to recurrent C. difficile by Dr. Black noting a normal esophagus, stomach and duodenum. 7/28/2023 EGD with Dr. Black was normal.  Recommended repeating in 1 year. 10/15/2024 EGD with Dr. Black was normal and no specimens were collected.  Due for surveillance on 10/2025. 1/31/2024 RUQ US noting mildly increased/coarsened hepatic echotexture, considered mild steatosis or early chronic parenchymal liver disease.  No sonographic evidence of suspicious focal hepatic lesion.

## 2025-06-30 NOTE — PHYSICAL EXAM GASTROINTESTINAL
soft, mild tenderness to RUQ, nondistended,  no guarding or rigidity,  no masses palpable,  normal bowel sounds,  no hepatosplenomegaly,  no rebound tenderness

## 2025-07-23 ENCOUNTER — OFFICE VISIT (OUTPATIENT)
Dept: URBAN - METROPOLITAN AREA CLINIC 19 | Facility: CLINIC | Age: 35
End: 2025-07-23
Payer: COMMERCIAL

## 2025-07-23 DIAGNOSIS — K70.30 ALCOHOLIC CIRRHOSIS OF LIVER WITHOUT ASCITES: ICD-10-CM

## 2025-07-23 PROCEDURE — 99213 OFFICE O/P EST LOW 20 MIN: CPT

## 2025-07-23 RX ORDER — PANTOPRAZOLE SODIUM 40 MG/1
TAKE ONE TABLET BY MOUTH DAILY TABLET, DELAYED RELEASE ORAL
Qty: 90 TABLET | Refills: 1 | Status: ACTIVE | COMMUNITY

## 2025-07-23 RX ORDER — LACTULOSE 20 G/20G
1 PACKET POWDER, FOR SOLUTION ORAL
Qty: 30 | Refills: 5 | Status: ACTIVE | COMMUNITY
Start: 2025-06-30

## 2025-07-23 NOTE — HPI-TODAY'S VISIT:
Mr. Deng returns today for reevaluation of his mental fogginess. Last seen on 6/30/2025 with complaints of mental fogginess in the setting of cirrhosis, night shift work and poor nutrition.  At that time I recommended starting lactulose for 3 weeks and a multivitamin.  Today he reports having mild epigastric pain that started over the past 24 hours. He did not take the recommended Lactulose. He is taking a multivitamin. He is taking 4 200 mg caffeine tablets to help him stay awake while he works ([employer]) Reports having very dry hands and feet.  Previous GI workup: 2/12/2022 EGD by Dr. Hatch noted grade I esophageal varices, and portal hypertensive gastropathy. No specimens collected. 2/24/2022 EGD done for hematemesis by Dr. Black was normal and no specimens collected. 7/14/2022 fecal transplant due to recurrent C. difficile by Dr. Black noting a normal esophagus, stomach and duodenum. 7/28/2023 EGD with Dr. Black was normal. Recommended repeating in 1 year. 10/15/2024 EGD with Dr. Black was normal and no specimens were collected. Due for surveillance on 10/2025. 1/31/2024 RUQ US noting mildly increased/coarsened hepatic echotexture, considered mild steatosis or early chronic parenchymal liver disease. No sonographic evidence of suspicious focal hepatic lesion.  6/19/2025 CT A/P without contrast noting morphologic changes of underlying liver disease, possibly early cirrhosis. Evaluation for underlying lesions is limited without IV contrast. Prominent right mesenteric lymph nodes are nonspecific.
no